# Patient Record
Sex: FEMALE | Employment: FULL TIME | URBAN - METROPOLITAN AREA
[De-identification: names, ages, dates, MRNs, and addresses within clinical notes are randomized per-mention and may not be internally consistent; named-entity substitution may affect disease eponyms.]

---

## 2017-12-11 ENCOUNTER — COMMUNICATION - HEALTHEAST (OUTPATIENT)
Dept: FAMILY MEDICINE | Facility: CLINIC | Age: 27
End: 2017-12-11

## 2017-12-15 ENCOUNTER — OFFICE VISIT - HEALTHEAST (OUTPATIENT)
Dept: FAMILY MEDICINE | Facility: CLINIC | Age: 27
End: 2017-12-15

## 2017-12-15 DIAGNOSIS — Z23 NEED FOR TDAP VACCINATION: ICD-10-CM

## 2017-12-15 DIAGNOSIS — R76.12 POSITIVE QUANTIFERON-TB GOLD TEST: ICD-10-CM

## 2017-12-15 ASSESSMENT — MIFFLIN-ST. JEOR: SCORE: 1324.61

## 2017-12-18 ENCOUNTER — COMMUNICATION - HEALTHEAST (OUTPATIENT)
Dept: FAMILY MEDICINE | Facility: CLINIC | Age: 27
End: 2017-12-18

## 2019-02-08 ENCOUNTER — OFFICE VISIT - HEALTHEAST (OUTPATIENT)
Dept: FAMILY MEDICINE | Facility: CLINIC | Age: 29
End: 2019-02-08

## 2019-02-08 DIAGNOSIS — R30.0 DYSURIA: ICD-10-CM

## 2019-02-08 DIAGNOSIS — N39.0 URINARY TRACT INFECTION WITHOUT HEMATURIA, SITE UNSPECIFIED: ICD-10-CM

## 2019-02-08 LAB
ALBUMIN UR-MCNC: NEGATIVE MG/DL
AMORPH CRY #/AREA URNS HPF: ABNORMAL /[HPF]
APPEARANCE UR: CLEAR
BACTERIA #/AREA URNS HPF: ABNORMAL HPF
BILIRUB UR QL STRIP: NEGATIVE
COLOR UR AUTO: ABNORMAL
GLUCOSE UR STRIP-MCNC: NEGATIVE MG/DL
HGB UR QL STRIP: ABNORMAL
KETONES UR STRIP-MCNC: NEGATIVE MG/DL
LEUKOCYTE ESTERASE UR QL STRIP: ABNORMAL
NITRATE UR QL: NEGATIVE
PH UR STRIP: 7.5 [PH] (ref 5–8)
RBC #/AREA URNS AUTO: ABNORMAL HPF
SP GR UR STRIP: 1.01 (ref 1–1.03)
SQUAMOUS #/AREA URNS AUTO: ABNORMAL LPF
UROBILINOGEN UR STRIP-ACNC: ABNORMAL
WBC #/AREA URNS AUTO: ABNORMAL HPF
WBC CLUMPS #/AREA URNS HPF: ABNORMAL /[HPF]

## 2019-02-09 LAB — BACTERIA SPEC CULT: NO GROWTH

## 2019-02-11 ENCOUNTER — COMMUNICATION - HEALTHEAST (OUTPATIENT)
Dept: FAMILY MEDICINE | Facility: CLINIC | Age: 29
End: 2019-02-11

## 2019-02-18 ENCOUNTER — OFFICE VISIT - HEALTHEAST (OUTPATIENT)
Dept: FAMILY MEDICINE | Facility: CLINIC | Age: 29
End: 2019-02-18

## 2019-02-18 DIAGNOSIS — Z22.7 TB LUNG, LATENT: ICD-10-CM

## 2019-02-18 DIAGNOSIS — Z13.220 SCREENING FOR LIPID DISORDERS: ICD-10-CM

## 2019-02-18 DIAGNOSIS — Z31.69 PRE-CONCEPTION COUNSELING: ICD-10-CM

## 2019-02-18 DIAGNOSIS — Z00.00 ROUTINE HEALTH MAINTENANCE: ICD-10-CM

## 2019-02-18 DIAGNOSIS — Z12.4 SCREENING FOR MALIGNANT NEOPLASM OF CERVIX: ICD-10-CM

## 2019-02-18 DIAGNOSIS — Z13.1 SCREENING FOR DIABETES MELLITUS: ICD-10-CM

## 2019-02-18 LAB
ALBUMIN SERPL-MCNC: 3.8 G/DL (ref 3.5–5)
ALP SERPL-CCNC: 45 U/L (ref 45–120)
ALT SERPL W P-5'-P-CCNC: 11 U/L (ref 0–45)
ANION GAP SERPL CALCULATED.3IONS-SCNC: 8 MMOL/L (ref 5–18)
AST SERPL W P-5'-P-CCNC: 14 U/L (ref 0–40)
BILIRUB SERPL-MCNC: 1 MG/DL (ref 0–1)
BUN SERPL-MCNC: 8 MG/DL (ref 8–22)
CALCIUM SERPL-MCNC: 9.1 MG/DL (ref 8.5–10.5)
CHLORIDE BLD-SCNC: 106 MMOL/L (ref 98–107)
CHOLEST SERPL-MCNC: 142 MG/DL
CO2 SERPL-SCNC: 25 MMOL/L (ref 22–31)
CREAT SERPL-MCNC: 0.7 MG/DL (ref 0.6–1.1)
ERYTHROCYTE [DISTWIDTH] IN BLOOD BY AUTOMATED COUNT: 12.7 % (ref 11–14.5)
FASTING STATUS PATIENT QL REPORTED: YES
GFR SERPL CREATININE-BSD FRML MDRD: >60 ML/MIN/1.73M2
GLUCOSE BLD-MCNC: 91 MG/DL (ref 70–125)
HCT VFR BLD AUTO: 41.7 % (ref 35–47)
HDLC SERPL-MCNC: 50 MG/DL
HGB BLD-MCNC: 13.7 G/DL (ref 12–16)
LDLC SERPL CALC-MCNC: 84 MG/DL
MCH RBC QN AUTO: 29.4 PG (ref 27–34)
MCHC RBC AUTO-ENTMCNC: 32.9 G/DL (ref 32–36)
MCV RBC AUTO: 90 FL (ref 80–100)
PLATELET # BLD AUTO: 401 THOU/UL (ref 140–440)
PMV BLD AUTO: 7.4 FL (ref 7–10)
POTASSIUM BLD-SCNC: 4.2 MMOL/L (ref 3.5–5)
PROT SERPL-MCNC: 6.8 G/DL (ref 6–8)
RBC # BLD AUTO: 4.66 MILL/UL (ref 3.8–5.4)
SODIUM SERPL-SCNC: 139 MMOL/L (ref 136–145)
TRIGL SERPL-MCNC: 42 MG/DL
WBC: 9.5 THOU/UL (ref 4–11)

## 2019-02-18 ASSESSMENT — MIFFLIN-ST. JEOR: SCORE: 1338.55

## 2019-02-19 ENCOUNTER — AMBULATORY - HEALTHEAST (OUTPATIENT)
Dept: FAMILY MEDICINE | Facility: CLINIC | Age: 29
End: 2019-02-19

## 2019-02-19 LAB
25(OH)D3 SERPL-MCNC: 12.6 NG/ML (ref 30–80)
25(OH)D3 SERPL-MCNC: 12.6 NG/ML (ref 30–80)

## 2020-06-05 ENCOUNTER — RECORDS - HEALTHEAST (OUTPATIENT)
Dept: ADMINISTRATIVE | Facility: OTHER | Age: 30
End: 2020-06-05

## 2020-06-10 ENCOUNTER — RECORDS - HEALTHEAST (OUTPATIENT)
Dept: ADMINISTRATIVE | Facility: OTHER | Age: 30
End: 2020-06-10

## 2020-06-11 ENCOUNTER — COMMUNICATION - HEALTHEAST (OUTPATIENT)
Dept: ADMINISTRATIVE | Facility: CLINIC | Age: 30
End: 2020-06-11

## 2020-06-16 ENCOUNTER — OFFICE VISIT - HEALTHEAST (OUTPATIENT)
Dept: EDUCATION SERVICES | Facility: CLINIC | Age: 30
End: 2020-06-16

## 2020-06-16 DIAGNOSIS — O24.410 DIET CONTROLLED GESTATIONAL DIABETES MELLITUS (GDM) IN THIRD TRIMESTER: ICD-10-CM

## 2020-06-24 ENCOUNTER — OFFICE VISIT - HEALTHEAST (OUTPATIENT)
Dept: EDUCATION SERVICES | Facility: CLINIC | Age: 30
End: 2020-06-24

## 2020-06-24 DIAGNOSIS — O24.410 DIET CONTROLLED GESTATIONAL DIABETES MELLITUS (GDM) IN THIRD TRIMESTER: ICD-10-CM

## 2020-07-08 ENCOUNTER — COMMUNICATION - HEALTHEAST (OUTPATIENT)
Dept: ADMINISTRATIVE | Facility: CLINIC | Age: 30
End: 2020-07-08

## 2020-07-09 ENCOUNTER — OFFICE VISIT - HEALTHEAST (OUTPATIENT)
Dept: EDUCATION SERVICES | Facility: CLINIC | Age: 30
End: 2020-07-09

## 2020-07-09 DIAGNOSIS — O24.410 DIET CONTROLLED GESTATIONAL DIABETES MELLITUS (GDM) IN THIRD TRIMESTER: ICD-10-CM

## 2020-07-28 ENCOUNTER — OFFICE VISIT - HEALTHEAST (OUTPATIENT)
Dept: EDUCATION SERVICES | Facility: CLINIC | Age: 30
End: 2020-07-28

## 2020-07-28 DIAGNOSIS — O24.410 DIET CONTROLLED GESTATIONAL DIABETES MELLITUS (GDM) IN THIRD TRIMESTER: ICD-10-CM

## 2020-08-07 ENCOUNTER — COMMUNICATION - HEALTHEAST (OUTPATIENT)
Dept: EDUCATION SERVICES | Facility: CLINIC | Age: 30
End: 2020-08-07

## 2020-08-14 ENCOUNTER — COMMUNICATION - HEALTHEAST (OUTPATIENT)
Dept: EDUCATION SERVICES | Facility: CLINIC | Age: 30
End: 2020-08-14

## 2020-08-14 ENCOUNTER — AMBULATORY - HEALTHEAST (OUTPATIENT)
Dept: EDUCATION SERVICES | Facility: CLINIC | Age: 30
End: 2020-08-14

## 2020-08-14 DIAGNOSIS — O24.410 DIET CONTROLLED GESTATIONAL DIABETES MELLITUS (GDM) IN THIRD TRIMESTER: ICD-10-CM

## 2020-08-18 ENCOUNTER — ANESTHESIA - HEALTHEAST (OUTPATIENT)
Dept: OBGYN | Facility: HOSPITAL | Age: 30
End: 2020-08-18

## 2020-08-18 ENCOUNTER — RECORDS - HEALTHEAST (OUTPATIENT)
Dept: ADMINISTRATIVE | Facility: OTHER | Age: 30
End: 2020-08-18

## 2020-08-19 ENCOUNTER — COMMUNICATION - HEALTHEAST (OUTPATIENT)
Dept: SCHEDULING | Facility: CLINIC | Age: 30
End: 2020-08-19

## 2020-08-19 ENCOUNTER — HOME CARE/HOSPICE - HEALTHEAST (OUTPATIENT)
Dept: HOME HEALTH SERVICES | Facility: HOME HEALTH | Age: 30
End: 2020-08-19

## 2021-05-31 VITALS — HEIGHT: 65 IN | WEIGHT: 134.2 LBS | BODY MASS INDEX: 22.36 KG/M2

## 2021-06-02 VITALS — HEIGHT: 65 IN | BODY MASS INDEX: 22.73 KG/M2 | WEIGHT: 136.4 LBS

## 2021-06-02 VITALS — BODY MASS INDEX: 22.18 KG/M2 | WEIGHT: 133.3 LBS

## 2021-06-08 NOTE — TELEPHONE ENCOUNTER
06.11 - called x 1 - rings, then  stats call cannot be completed at this time, to hang up and try again

## 2021-06-08 NOTE — PATIENT INSTRUCTIONS - HE
1. Check urine for ketones in the morning. Your goal is negative or trace ketones. If you have ketones in your urine it means you are not eating enough before you go to bed. Eat a larger bedtime snack and include protein. Remember that ketones are not good for your baby. Drinking water during the day helps to dilute ketones.    2. Test blood sugar 4 times per day. Before breakfast and 1 hour after meals.        Blood sugar goals:       Before breakfast: less 95      1 hour after meals: less 140      2 hours after meals: less 120    3. Eat 3 meals and 3 snacks per day according to the meal plan.   Breakfast: 30-45 grams of carbohydrate with protein   Lunch and dinner: 45-60 grams of carbohydrate  Snacks: 15-30 grams of carbohydrate with protein    4. Avoid fruit, juice and cereal at breakfast. These foods tend to cause high blood sugar.    5. Include high fiber, whole grain foods instead of processed white food. Healthier choices are whole grain bread, pasta and brown rice or wild rice.     6. Avoid high sugar, low nutrient foods like sugary drinks, candy chocolate, syrup, chips and desserts.    7. Staying active after meals helps to decrease blood sugar.    8. Keep a detailed food and blood sugar record and note ketone levels.     9. Next telephone visit in one week on Wednesday, 6/24/2020 at 3:00 pm.

## 2021-06-08 NOTE — TELEPHONE ENCOUNTER
[facility]   Referring Provider: Dr. Zavaleta  DX: GDM     Ref./rec. Were received in DM consult folder on 06.10.2020 @ 7:54 pm

## 2021-06-08 NOTE — PROGRESS NOTES
Diabetes Educator has received verbal consent for a telephone visit for the patient.    GESTATIONAL DIABETES CARE PLAN    Assessment: Initial telephone visit for gestational diabetes education and counseling. Instructed on what is gestational diabetes and how to manage it. Explained how to check blood sugar at home. Encouraged patient to view a YouTube video or read the meter manual to learn how to use the meter. Discussed what are ketones and how to check for them in the morning. Instructed on carbohydrate counting, meal planning and label reading. Both Clara and her  were on the call today. They had many excellent questions, especially about food choices. Sent literature by email with permission including an   2 day sample menu and  carbohydrate counting list. Clara does not have a printer so written information and AVS also sent to their home address.    Plan: Check blood glucose 4 times daily, check ketones each morning before breakfast and follow dietary guidelines as recommended during today's encounter. Staying active after meals helps to lower blood sugar levels. Next telephone visit in one week.     Provider: Dr. Marleny Zavaleta [facility]  Provider's Diagnosis (per referral form): Diet controlled gestational diabetes in 3rd trimester (024.410)    Weight:151.9 lb at home today    Pre-pregnancy weight: 140 lb    Glucose screen: 227 (6/5/2020)  OGTT: not given  EDC: Estimated Date of Delivery: 8/31/2020. 29w0d  Pregnancy #: 1  Previous GDM: No  Medications: No current outpatient medications on file.    M[facility]: [facility] covered by insurance    BG monitoring goals: Fasting <95; 1 hour post start of meal <140. Test 4 x per day.  Check fasting a.m. ketones: Yes  GDM meal pattern/carb counting taught per guidelines: Yes  Goals: Nutrition: GDM meal plan  Activity:  Walking after meals when able/staying active  Monitoring: Check BG 4x/day  (before breakfast and 1 hour after each meal)  Check urine ketones once daily every morning    DIABETES CARE PLAN AND EDUCATION RECORD  FV Understanding Gestational Diabetes  FV GDM blood sugar log  IDC [place] carb list  FV Asian American sample menu  FV Asian American foods with pictures and carb info    Gestational Diabetes Disease Process/Management During Pregnancy: Discussed and Literature provided    Meter (per above goals): Discussed, Literature provided.    Nutrition Management  Weight: Assessed, Discussed and Literature provided  Portions/Balance: Assessed, Discussed and Literature provided  Carb ID/Count: Assessed, Discussed and Literature provided  Label Reading: Assessed, Discussed and Literature provided  Menu Planning: Assessed, Discussed and Literature provided    Physical Activity: Discussed and Literature provided    Acute Complications: Prevent, Detect, Treat:    Goal Setting and Problem Solving: Assessed and Discussed  Barriers: Assessed  Psychosocial Adjustments: Assessed      Time: 60 Minutes  Visit Type: Diabetes Self-Management Training ()    Brigida Martinez RD, LD, [facility]  Certified Diabetes Care and   6/16/2020

## 2021-06-08 NOTE — TELEPHONE ENCOUNTER
Date: 6/16/2020 Status: Paloma   Time: 3:00 PM Length: 30   Visit Type: TELEPHONE VISIT NEW [2800] Copay: $0.00   Provider: Brigida Martinez RD, CDE

## 2021-06-09 NOTE — TELEPHONE ENCOUNTER
[facility] calling to obtain notes from previous visits.    Please fax to      Phone @ [phone]

## 2021-06-09 NOTE — PATIENT INSTRUCTIONS - HE
PLAN:  1. Continue to check urine for ketones in the morning. Your goal is negative or trace ketones. Continue to drink a lot of water to help dilute the ketones.    2. Continue to check blood sugar 4 times per day. Before breakfast and 1 hour after meals.        Blood sugar goals:       Before breakfast: less 95      1 hour after meals: less 140      2 hours after meals: less 120    3. Continue to eat 3 meals and 3 snacks per day according to the meal plan.     4. Continue to stay active after meals to manage blood sugars.    5. Continue to keep a detailed food and blood sugar record and note ketone levels.     6. Next telephone follow-up in about 2 weeks on Tuesday, 7/28/2020 at 3:00 pm    7. Call Diabetes Care or email if you have 3 blood sugars above target in one week.

## 2021-06-09 NOTE — PATIENT INSTRUCTIONS - HE
PLAN  1. Continue to check urine for ketones in the morning. Your goal is negative or trace ketones. If you have ketones in your urine it means you are not eating enough before you go to bed. Eat a larger bedtime snack and include protein. Remember that ketones are not good for your baby. Drinking water during the day helps to dilute ketones. 64 ounces of water each day is enough water.    2. Continue to check blood sugar 4 times per day. Before breakfast and 1 hour after meals.        Blood sugar goals:      Before breakfast: less 95      1 hour after meals: less 140      2 hours after meals: less 120    3. Continue to eat 3 meals and 3 snacks per day according to the meal plan.  Breakfast: 30-45 grams of carbohydrate with protein  Lunch and dinner: 45-60 grams of carbohydrate  Snacks: 15-30 grams of carbohydrate with protein    4. Continue to stay active after meals to manage blood sugars. Walking or staying active for 10-15 minutes after meals is recommended.    5. Keep a detailed food and blood sugar record and note ketone levels.    6. Next follow-up in 2 weeks on Thursday, July 9th at 3:00 pm.    7. Call Diabetes Care if you have 3 blood sugars above target in one week or have small or moderate ketones or with any questions.

## 2021-06-10 NOTE — ANESTHESIA POSTPROCEDURE EVALUATION
Patient: Clara Phillips  * No procedures listed *  Anesthesia type: epidural    Patient location: Labor and Delivery  Last vitals: No vitals data found for the desired time range.    Post vital signs: stable  Level of consciousness: awake and responds to simple questions  Post-anesthesia pain: pain controlled  Post-anesthesia nausea and vomiting: no  Pulmonary: unassisted, return to baseline  Cardiovascular: stable and blood pressure at baseline  Hydration: adequate  Anesthetic events: no    QCDR Measures:  ASA# 11 - Reanna-op Cardiac Arrest: ASA11B - Patient did NOT experience unanticipated cardiac arrest  ASA# 12 - Reanna-op Mortality Rate: ASA12B - Patient did NOT die  ASA# 13 - PACU Re-Intubation Rate: ASA13B - Patient did NOT require a new airway mgmt  ASA# 10 - Composite Anes Safety: ASA10A - No serious adverse event    Additional Notes:  Neuraxial block has worn off, no residual numbness or weakness. Pain controlled. Denies headache or back pain. No further questions or concerns. Instructed that we are available 24/7 should she have questions pertaining to her epidural.

## 2021-06-10 NOTE — ANESTHESIA PREPROCEDURE EVALUATION
Anesthesia Evaluation      Patient summary reviewed   No history of anesthetic complications     Airway   Mallampati: II  Neck ROM: full   Pulmonary - negative ROS   (-) asthma, not a smoker                         Cardiovascular - negative ROS  (-) hypertension   Neuro/Psych - negative ROS     Endo/Other    (+) diabetes mellitus (gestational), pregnant  (-) no obesity     GI/Hepatic/Renal - negative ROS   (-) GERD          Dental - normal exam                        Anesthesia Plan  Planned anesthetic: epidural  Patient requests labor epidural. Chart reviewed, including labs. Reviewed options and risks with the patient, including but not limited to: bleeding, infection, damage to tissues under the skin (nerves, muscles, blood vessels), hypotension, headache, and epidural failure. Questions answered, consent signed. Patient agrees to elective labor epidural.     ASA 2     Anesthetic plan and risks discussed with: patient and spouse    Post-op plan: epidural analgesia

## 2021-06-10 NOTE — PROGRESS NOTES
Diabetes Educator has received verbal consent for a telephone visit for the patient. Declined video visit.    GESTATIONAL DIABETES CARE PLAN-FOLLOW-UP   Telephone visit #4    ASSESSMENT/PLAN:   Two week telephone follow-up visit for gestational diabetes education and counseling. Clara is testing her blood sugar 4 times per day. She had a few blood sugars slightly above target that were not explained within 2 weeks.   Patient is testing her urine for ketones in the morning with most results negative. Both Clara and her  work together to portion out food and document daily food intake.    Instructed on post-tal care and how to prevent type 2 diabetes in the future. Completed education topics. No future appointments scheduled.    Blood glucose out of target the past 2 weeks  FBG 96  1 hour after  Lunch 143  2 hours after lunch 128    PLAN:  1. Continue to check urine for ketones in the morning. Your goal is negative or trace ketones.  2. Continue to check blood sugar 4 times per day. Before breakfast and 1 hour after meals.        Blood sugar goals:       Before breakfast: less 95      1 hour after meals: less 140      2 hours after meals: less 120    3. Continue to eat 3 meals and 3 snacks per day according to the meal plan.     4. Continue to stay active after meals to manage blood sugars.    Call Diabetes Care or send a [employer] message if you have 3 blood sugars above target in one week.    Diabetes Care: [phone]      SUBJECTIVE/OBJECTIVE:  Clara Phillips is a 29 y.o. female referred by Dr. Marleny Zavaleta, [facility]    Weight: no current    Pre-pregnancy weight:140 lb  GDM screen: 227 (20)  OGTT: No results found for this or any previous visit.  CAESAR: Estimated Date of Delivery: 2020 35w0d Having a girl  Pregnancy #: 1  Previous GDM: No    BG monitoring goals: Fasting <95; 1 hour post start of meal <140. Test 4 times per day.  Check fasting a.m. ketones: Yes  GDM meal plan/carb  counting taught per guidelines: Yes    EDUCATION RECORD    Gestational Diabetes Disease Process/Preconception Care/Management During Pregnancy/Postpartum:Discussed and Literature provided    Meter (per above goals): Competent. Contour Next EZ    Nutrition Management    Weight: Competent  Portions/Balance: Assessed and Discussed and Competent  Carb ID/Count: Competent  Label Reading: Competent     Physical Activity: Discussed and Competent    Medications: N/A    Acute Complications: Prevent, Detect, Treat:    Hypoglycemia: N/A  Hyperglycemia: Assessed and Discussed and Competent  Goal Setting and Problem Solving: Assessed and Discussed and Literature provided  Barriers: Assessed  Psychosocial Adjustments: Assessed    Time: 30 minutes  Visit Type: GDM Individual Follow-up. Diabetes Self-Management Training ()  Provider's Diagnosis:  Diet Controlled Gestational Diabetes in 3rd trimester (024.410)    Brigida Martinez RD, LD, CDCES  Certified Diabetes Care and   7/28/2020

## 2021-06-10 NOTE — PATIENT INSTRUCTIONS - HE
Today is your last visit. Continue to check for ketones once daily in the morning, continue to check your blood sugars 4 times per day until you deliver your baby. Call Diabetes Care or send a [employer] message if you have 3 elevated blood sugars within one week.   Diabetes Care [phone].     During labor, your blood sugar levels will be checked often. The effects of labor on blood sugar levels will vary from woman to woman, hour to hour.     After your baby is born, your blood sugar levels should quickly return to normal. Be sure to follow the testing guidelines below:    Two to six weeks after delivery, start checking your blood sugar again. Do four tests each week until you see your care provider again. Write your results in a log book.    When you are not pregnant, normal blood sugars are:    Before breakfast: Under 100  2 hours after meals Under 140      Six to twelve weeks after delivery at your provider visit ask to have a glucose test to be sure your diabetes is gone. Show the doctor your log book.    Every 1 to 3 years have your doctor test you for diabetes at your regular physical exam.    After delivery, and your provider has said that it is safe to be active, work toward getting 150 minutes each week of physical activity. Maintaining a healthy body weight and physical activity decrease your risk of getting Type 2 diabetes.

## 2021-06-10 NOTE — ANESTHESIA PROCEDURE NOTES
Epidural Block    Patient location during procedure: OB  Time Called: 8/18/2020 9:51 AM  Reason for Block:labor epidural  Staffing:  Performing  Anesthesiologist: Sonal Kim MD  Preanesthetic Checklist  Completed: patient identified, risks, benefits, and alternatives discussed, timeout performed, consent obtained, at patient's request, airway assessed, oxygen available, suction available, emergency drugs available and hand hygiene performed  Procedure  Patient position: sitting  Prep: ChloraPrep  Patient monitoring: continuous pulse oximetry, heart rate and blood pressure  Approach: midline  Location: L3-L4  Injection technique: JUAN air and JUAN saline  Number of Attempts:2  Needle  Needle type: [employer]   Needle gauge: 18 G     Catheter in Space: 5      Additional Notes:  JUAN at 6 cm. Catheter at 11 cm at skin. Negative aspiration heme/CSF. Negative test dose. Transient left paresthesia. No pain on injection of test dose. Patient tolerated procedure well.

## 2021-06-11 ENCOUNTER — OFFICE VISIT - HEALTHEAST (OUTPATIENT)
Dept: FAMILY MEDICINE | Facility: CLINIC | Age: 31
End: 2021-06-11

## 2021-06-11 DIAGNOSIS — E55.9 VITAMIN D DEFICIENCY: ICD-10-CM

## 2021-06-11 DIAGNOSIS — Z76.89 ENCOUNTER TO ESTABLISH CARE: ICD-10-CM

## 2021-06-11 DIAGNOSIS — Z13.228 SCREENING FOR METABOLIC DISORDER: ICD-10-CM

## 2021-06-11 DIAGNOSIS — Z86.32 H/O GESTATIONAL DIABETES MELLITUS, NOT CURRENTLY PREGNANT: ICD-10-CM

## 2021-06-11 DIAGNOSIS — L30.9 PERIANAL DERMATITIS: ICD-10-CM

## 2021-06-11 RX ORDER — ZINC OXIDE 20 %
OINTMENT (GRAM) TOPICAL
Qty: 56.7 G | Refills: 0 | Status: SHIPPED | COMMUNITY
Start: 2021-06-11 | End: 2021-11-29

## 2021-06-11 RX ORDER — CLOTRIMAZOLE AND BETAMETHASONE DIPROPIONATE 10; .64 MG/G; MG/G
CREAM TOPICAL
Qty: 15 G | Refills: 1 | Status: SHIPPED | OUTPATIENT
Start: 2021-06-11 | End: 2021-11-29

## 2021-06-11 ASSESSMENT — MIFFLIN-ST. JEOR: SCORE: 1438

## 2021-06-14 NOTE — PROGRESS NOTES
"Assessment/Plan:     1. Positive QuantiFERON-TB Gold test  Chest xray pending.  Little concern for active TB.  Will consider treatment of latent TB.   - XR Chest 2 Views    2. Need for Tdap vaccination  - Tdap vaccine,  8yo or older,  IM        Subjective:     Clara Phillips is a 26 y.o. female accompanied by her  who presents with a positive TB quantiferon test.  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  Patient is originally from [facility], and moved her ~10 months ago.  Her  is working at [employer].  Patient lived in [place] her whole life, up until now.  Denies any fever, chest pain, cough, or shortness of breath.  No known exposure to TB.  Patient is otherwise feeling well and has no complaints.       The following portions of the patient's history were reviewed and updated as appropriate: allergies, current medications, past family history, past medical history, past social history, past surgical history and problem list.    Review of Systems  Pertinent items are noted in HPI.     Objective:     /60 (Patient Site: Right Arm, Patient Position: Sitting, Cuff Size: Adult Regular)  Pulse 88  Ht 5' 5\" (1.651 m)  Wt 134 lb 3.2 oz (60.9 kg)  LMP 11/24/2017 (Exact Date)  Breastfeeding? No  BMI 22.33 kg/m2    General Appearance: Alert, cooperative, no distress, appears stated age  Neck: Supple, symmetrical, trachea midline, no adenopathy  Lungs: Clear to auscultation bilaterally, respirations unlabored  Heart: Regular rate and rhythm, S1 and S2 normal, no murmur, rub, or gallop    Asiya Chopra, NP-C    "

## 2021-06-16 PROBLEM — O24.410 DIET CONTROLLED GESTATIONAL DIABETES MELLITUS (GDM) IN THIRD TRIMESTER: Status: ACTIVE | Noted: 2020-06-16

## 2021-06-16 PROBLEM — E55.9 VITAMIN D DEFICIENCY: Status: ACTIVE | Noted: 2019-02-19

## 2021-06-16 PROBLEM — Z34.90 PREGNANT: Status: ACTIVE | Noted: 2020-08-18

## 2021-06-16 PROBLEM — Z22.7 TB LUNG, LATENT: Status: ACTIVE | Noted: 2019-02-18

## 2021-06-17 NOTE — TELEPHONE ENCOUNTER
Telephone Encounter by Brigida Martinez RD, CDE at 8/7/2020  2:03 PM     Author: Brigida Martinez RD, CDE Service: -- Author Type: Diabetes Ed    Filed: 8/7/2020  2:09 PM Encounter Date: 8/7/2020 Status: Signed    : Brigida Martinez RD, CDE (Registered Dietitian)     Summary: GDM food and blood sugar records          Clara continues to check her blood sugar 4 times per day. She had 2 elevated blood sugars after meals. One elevation was explained. Ketones negative. The light blue column is activity. She is less active than previous. No further telephone visits indicated. Patient to contact me for 3 blood sugar elevations in one week or if she has small, moderate or large ketones.    CAESAR 8/31/3030

## 2021-06-17 NOTE — PATIENT INSTRUCTIONS - HE
Patient Instructions by Asiya Chopra NP at 2/18/2019  7:40 AM     Author: Asiya Chopra NP Service: -- Author Type: Nurse Practitioner    Filed: 2/18/2019  7:45 AM Encounter Date: 2/18/2019 Status: Signed    : Asiya Chopra NP (Nurse Practitioner)       Patient Education     Prevention Guidelines, Women Ages 18 to 39  Screening tests and vaccines are an important part of managing your health. A screening test is done to find possible disorders or diseases in people who don't have any symptoms. The goal is to find a disease early so lifestyle changes can be made and you can be watched more closely to reduce the risk of disease, or to detect it early enough to treat it most effectively. Screening tests are not considered diagnostic, but are used to determine if more testing is needed. Health counseling is essential, too. Below are guidelines for these, for women ages 18 to 39. Talk with your healthcare provider to make sure youre up-to-date on what you need.  Screening Who needs it How often   Alcohol misuse All women in this age group At routine exams   Blood pressure All women in this age group Yearly checkup if your blood pressure is normal  Normal blood pressure is less than 120/80 mm Hg  If your blood pressure reading is higher than normal, follow the advice of your healthcare provider   Breast cancer All women in this age group should talk with their healthcare providers about the need for clinical breast exams (CBE)1 Clinical breast exam every 3 years1   Cervical cancer Women ages 21 and older Women between ages 21 and 29 should have a Pap test every 3 years; women between ages 30 and 65 are advised to have a Pap test plus an HPV test every 5 years   Chlamydia Sexually active women ages 24 and younger, and women at increased risk for infection Every 3 years if you're at risk or have symptoms   Depression All women in this age group At routine exams   Type 2 diabates, prediabetes All women  with no symptoms who are overweight or obese and have 1 or more other risk factors for diabetes At least every 3 years. Also, testing for diabetes during pregnancy after the 24th week.    Type 2 diabetes, prediabetes All women diagnosed with gestational diabetes Lifelong testing every 3 years   Type 2 diabetes All women with prediabetes Every year   Gonorrhea Sexually active women at increased risk for infection At routine exams   Hepatitis C Anyone at increased risk At routine exams   HIV All women should be tested at least once for HIV between the ages of 13 and 64 At routine exams. Those with risk factors for HIV should be tested at least annually.    Obesity All women in this age group At routine exams   Syphilis Women at increased risk for infection should talk with their healthcare provider At routine exams   Tuberculosis Women at increased risk for infection should talk with their healthcare provider Ask your healthcare provider   Vision All women in this age group At least 1 complete exam in your 20s, and 2 in your 30s   Vaccine2 Who needs it How often   Chickenpox (varicella) All women in this age group who have no record of this infection or vaccine 2 doses; the second dose should be given 4 to 8 weeks after the first dose   Hepatitis A Women at increased risk for infection should talk with their healthcare provider 2 doses given at least 6 months apart   Hepatitis B Women at increased risk for infection should talk with their healthcare provider 3 doses over 6 months; second dose should be given 1 month after the first dose; the third dose should be given at least 2 months after the second dose and at least 4 months after the first dose   Haemophilus influenzae Type B (HIB) Women at increased risk for infection should talk with their healthcare provider 1 to 3 doses   Human papillomavirus (HPV) All women in this age group up to age 26 3 doses; the second dose should be given 1 to 2 months after the first  dose and the third dose given 6 months after the first dose   Influenza (flu) All women in this age group Once a year   Measles, mumps, rubella (MMR) All women in this age group who have no record of these infections or vaccines 1 or 2 doses   Meningococcal Women at increased risk for infection should talk with their healthcare provider 1 or more doses   Pneumococcal conjugate vaccine (PCV13) and pneumococcal polysaccharide vaccine (PPSV23) Women at increased risk for infection should talk with their healthcare provider PCV13: 1 dose ages 19 to 65 (protects against 13 types of pneumococcal bacteria)  PPSV23: 1 to 2 doses through age 64, or 1 dose at 65 or older (protects against 23 types of pneumococcal bacteria)      Tetanus/diphtheria/pertussis (Td/Tdap) booster All women in this age group Td every 10 years, or a one-time dose of Tdap instead of a Td booster after age 18, then Td every 10 years   Counseling Who needs it How often   BRCA gene mutation testing for breast and ovarian cancer susceptibility Women with increased risk for having gene mutation When your risk is known   Breast cancer and chemoprevention Women at high risk for breast cancer When your risk is known   Diet and exercise Women who are overweight or obese When diagnosed, and then at routine exams   Domestic violence Women at the age in which they are able to have children At routine exams   Sexually transmitted infection prevention Women who are sexually active At routine exams   Skin cancer Prevention of skin cancer in fair-skinned adults At routine exams   Use of tobacco and the health effects it can cause All women in this age group Every visit   1According to the ACS, women ages 20 to 39 years should have a clinical breast exam (CBE) as part of their routine health exam every 3 years. Breast self-exams are an option for women starting in their 20s. But the USPSTF does not recommend CBE.  2Those who are 18 years old and not up-to-date on  their childhood vaccines should get all appropriate catch-up vaccines recommended by the CDC.  Date Last Reviewed: 10/1/2017    3901-0467 [employer] [address] [place] [address], [place] [address]. All rights reserved. This information is not intended as a substitute for professional medical care. Always follow your healthcare professional's instructions.

## 2021-06-17 NOTE — TELEPHONE ENCOUNTER
Telephone Encounter by Brigida Martinez RD, CDE at 8/14/2020  5:29 PM     Author: Brigida Martinez RD, CDE Service: -- Author Type: Diabetes Ed    Filed: 8/14/2020  5:35 PM Encounter Date: 8/14/2020 Status: Signed    : Brigida Martinez RD, CDE (Registered Dietitian)       GDM Follow-up    Clara continues to check her blood sugar 4 times per day and keep detailed food and blood sugar records.    Ketones are negative    2 elevated blood sugars in the past 2 weeks.        1 hour after dinner  after eating too many carbs    Currently not walking due to heat and humidity    CAESAR 8/31/2020    Renewed prescriptions for test strips and lancets to last until the end of her pregnancy. Praised her for her efforts.

## 2021-06-23 NOTE — PROGRESS NOTES
Assessment/Plan:     1. Urinary tract infection without hematuria, site unspecified  Positive UA.  Prescribed Macrobid two times a day x 5 days. Push fluids.  Urine culture pending; will notify if a change in antibiotic is indicated.   - nitrofurantoin, macrocrystal-monohydrate, (MACROBID) 100 MG capsule; Take 1 capsule (100 mg total) by mouth 2 (two) times a day for 5 days.  Dispense: 10 capsule; Refill: 0  - Culture, Urine    2. Dysuria  - Urinalysis-UC if Indicated        Subjective:     Clara Phillips is a 28 y.o. female who presents with complaints of dysuria and urinary frequency/urgency times 3 days.  Symptoms have worsened.  Associated symptoms include malodorous urine and lower abdominal pain.  Patient does feel feverish, although she has not checked any temperatures at home.  Denies any blood in her urine, back pain, or N/V/D.  She has been treated for a UTI once in the past.  She is drinking a lot of water, otherwise no other treatments tried.       The following portions of the patient's history were reviewed and updated as appropriate: allergies, current medications.  ,  Review of Systems  A comprehensive review of systems was performed and was otherwise negative    Objective:     /80   Pulse 80   Temp 98.7  F (37.1  C)   Wt 133 lb 4.8 oz (60.5 kg)   LMP 01/25/2019 (Exact Date)   BMI 22.18 kg/m      General Appearance: Alert, cooperative, no distress, appears stated age  Back: no CVA tenderness  Lungs: Clear to auscultation bilaterally, respirations unlabored  Heart: Regular rate and rhythm, S1 and S2 normal, no murmur, rub, or gallop   Abdomen: Soft, suprapubic tenderness, bowel sounds active all four quadrants,  no masses, no organomegaly      Asiya Chopra, NA

## 2021-06-24 NOTE — PROGRESS NOTES
FEMALE PREVENTATIVE EXAM    Assessment and Plan:       1. Routine health maintenance  Healthy female exam  - HM2(CBC w/o Differential)  - Vitamin D, Total (25-Hydroxy)    2. Screening for diabetes mellitus  - Comprehensive Metabolic Panel    3. Screening for lipid disorders  - Lipid [place] FASTING    4. Screening for malignant neoplasm of cervix  - Gynecologic Cytology (PAP Smear)    5. TB lung, latent  Patient did not start treatment for this and declines to start at this time.  Discussed possible risks.     6. Pre-conception counseling  Discussed starting a prenatal vitamin at least 1 month prior to trying for pregnancy.  Recommend at least 0.4 mg of folic acid.      Next follow up:  Return in about 1 year (around 2/18/2020) for Physical.    Immunization Review  Adult Imm Review: Declines immunizations today    I discussed the following with the patient:   Adult Healthy Living: Importance of regular exercise  Healthy nutrition    I have had an Advance Directives discussion with the patient.    Subjective:   Chief Complaint: Clara Phillips is an 28 y.o. female here for a preventative health visit.     HPI:  Patient is  without children.  She works in [employer].    Patient was seen last week for a UTI.  Her urine culture was actually negative, but she finished the antibiotic.  Her symptoms are completely resolved.    Patient was seen last year due to a positive quantiferon test.  Her CXR was negative.  Patient was prescribed Isoniazid for latent TB, but tells me she never started the medication.  She does not know why.  We discussed the benefits and risks of treating for latent TB.  She does not wish to restart the medication at this time.    Patient and her  are looking to start trying for pregnancy this summer.  They are currently using condoms.      No history of pap exam.  Menstrual periods are regular and predictable.    No concerns today.     Healthy Habits  Are you taking a daily aspirin? No  Do you  "typically exercising at least 40 min, 3-4 times per week?  NO  Do you usually eat at least 4 servings of fruit and vegetables a day, include whole grains and fiber and avoid regularly eating high fat foods? Yes  Have you had an eye exam in the past two years? Yes  Do you see a dentist twice per year? Yes  Do you have any concerns regarding sleep? No    Safety Screen  If you own firearms, are they secured in a locked gun cabinet or with trigger locks? The patient does not own any firearms  Do you feel you are safe where you are living?: Yes (2/18/2019  7:46 AM)  Do you feel you are safe in your relationship(s)?: Yes (2/18/2019  7:46 AM)      Review of Systems:  Please see above.  The rest of the review of systems are negative for all systems.     Pap History:   No - age 21-29 PAP every 3 years recommended  Cancer Screening       Status Date      PAP SMEAR Overdue 12/22/2010           Patient Care Team:  Asiya Chopra NP as PCP - General (Family Medicine)        History     Reviewed By Date/Time Sections Reviewed    Asiya Chopra NP 2/18/2019  7:54 AM Tobacco    Asiya Chopra NP 2/18/2019  7:49 AM Tobacco    Lori Bonds MA 2/18/2019  7:46 AM Tobacco            Objective:   Vital Signs:   Visit Vitals  /68   Pulse 64   Temp 98.5  F (36.9  C)   Ht 5' 5.25\" (1.657 m)   Wt 136 lb 6.4 oz (61.9 kg)   LMP 01/25/2019 (Exact Date)   BMI 22.52 kg/m           PHYSICAL EXAM  General Appearance: Alert, cooperative, no distress, appears stated age  Head: Normocephalic, without obvious abnormality, atraumatic  Eyes: PERRL, conjunctiva/corneas clear, EOM's intact  Ears: Normal TM's and external ear canals, both ears  Nose: Nares normal, septum midline  Throat: Lips, mucosa, and tongue normal; teeth and gums normal  Neck: Supple, symmetrical, trachea midline, no adenopathy;  thyroid: not enlarged, symmetric, no tenderness/mass/nodules  Lungs: Clear to auscultation bilaterally, respirations unlabored  Breasts: No " breast masses, tenderness, asymmetry, or nipple discharge.  Heart: Regular rate and rhythm, S1 and S2 normal, no murmur, rub, or gallop  Abdomen: Soft, non-tender, bowel sounds active all four quadrants,  no masses, no organomegaly  Pelvic: Normally developed genitalia with no external lesions or eruptions. Vagina and cervix show no lesions, inflammation, discharge or tenderness. No cystocele, No rectocele. No adnexal mass or tenderness.  Extremities: Extremities normal, atraumatic, no cyanosis or edema  Skin: Skin color, texture, turgor normal, no rashes or lesions  Lymph nodes: Cervical, supraclavicular, and axillary nodes normal  Neurologic: Normal   Psychologic: appropriate affective, answers all of my questions appropriately. No hallucinations, delusion, or suicidal ideations.    Asiya Chopra, NP

## 2021-06-26 NOTE — PROGRESS NOTES
Assessment/plan   Clara Phillips is a 30 y.o. female who is New  patient to my practice here with   Chief Complaint   Patient presents with     Anal Itching     since september 2020 after giving delivery        Clara was seen today for anal itching.    Diagnoses and all orders for this visit:    Screening for metabolic disorder  -     Lipid [place]; Future  -     Thyroid Stimulating Hormone (TSH); Future    Encounter to establish care    Perianal dermatitis  -     clotrimazole-betamethasone (LOTRISONE) cream; Apply to affected area 2 times daily  -     zinc oxide 20 % ointment; Apply to affected area to protect the skin after applying Lotrisone    H/O gestational diabetes mellitus, not currently pregnant  -     Glycosylated Hemoglobin A1c; Future    Vitamin D deficiency  -     Vitamin D, Total (25-Hydroxy); Future        Patient Instructions   To help with itching around the anal area we will recommend keeping the area completely clean and dry all the time  Advised to wash the area after each bowel movement  Avoid all irritants which might affect a bowel movement for example spicy food peña caffeine etc.  Also add fiber supplement like Metamucil or  variety call is Isabgoal mix in water every night to increase the fiber and have a formed stool every night  For local treatment we have send the prescription for Lotrisone which he use every night to help with the itching also added zinc oxide ointment as a protectant but you can always use the diaper rash cream which could be Desitin or A and D ointment to extra protect the skin if no improvement in symptoms please call us back    Blanquita Simmons MD 6/11/2021 1:39 PM          Subjective:      HPI: Clara Phillips is a 30 y.o. female who is here with chief complaint of perianal itching initially it was plan to do Pap and physical and establish care but patient was not ready to do any Pap or labs today so she will come back for that she only wanted to talk  about her itching in the perianal area  Patient just had a vacuum-assisted delivery 10-month ago currently breast-feeding had a second-degree repair gone through some constipation diarrhea cycle and also some pelvic floor dysfunction which required physical therapy and last 6 to 7 months now struggling with lot of perianal discomfort mostly itching and sometimes burning sensation  Denies any severe constipation or diarrhea currently but itching is so severe sometimes at nighttime that she could not sleep she had used some numbing gel in the past which was given for her repair which helped some time but not enough    Patient is transferring care from [facility] not able to review any records from them only records available are through [facility] where she delivered the child I did able to review some records from them she she did had history of gestational diabetes            I have personally reviewed the patient's allergies, medications, past medical history, family history, social history, rooming notes and problem list in detail and updated the patient record as necessary.      Past Medical History:   Diagnosis Date     Diet controlled gestational diabetes mellitus (GDM) in third trimester 6/16/2020     Past Surgical History:   Procedure Laterality Date     NO PAST SURGERIES       Patient has no known allergies.  Current Outpatient Medications   Medication Sig Dispense Refill     acetaminophen (TYLENOL) 325 MG tablet Take 1-2 tablets (325-650 mg total) by mouth every 4 (four) hours as needed.  0     docusate sodium (COLACE) 100 MG capsule Take 1 capsule (100 mg total) by mouth daily. 30 capsule 0     prenatal vitamin iron-folic acid 27mg-0.8mg (PRENATAL S) 27 mg iron- 800 mcg Tab tablet Take 1 tablet by mouth daily.       cholecalciferol, vitamin D3, 1,000 unit (25 mcg) tablet Take 2,500 Units by mouth daily.       clotrimazole-betamethasone (LOTRISONE) cream Apply to affected area 2 times  "daily 15 g 1     zinc oxide 20 % ointment Apply to affected area to protect the skin after applying Lotrisone 56.7 g 0     No current facility-administered medications for this visit.      Family History   Problem Relation Age of Onset     Hypertension Mother      Hypertension Father      No Medical Problems Brother        Patient Active Problem List   Diagnosis     TB lung, latent     Vitamin D deficiency     Diet controlled gestational diabetes mellitus (GDM) in third trimester     Pregnant       Review of Systems   12 point comprehensive review of systems was negative except as noted and HPI     Social History     Social History Narrative     Not on file       Objective:     Vitals:    06/11/21 1132   BP: 110/80   Pulse: 80   Temp: 98.9  F (37.2  C)   TempSrc: Oral   SpO2: 99%   Weight: 162 lb 11.2 oz (73.8 kg)   Height: 5' 4\" (1.626 m)       Physical Exam:   Physical Exam:  General Appearance:  Appears comfortable, Alert, cooperative, no distress,   Anal /rectal: Mild anal dermatitis no hemorrhoids or skin tags  25 minutes minutes spent on the day of encounter doing chart review, history and exam, documentation, and further activities as noted.     This note has been dictated using voice recognition software. Any grammatical or context distortions are unintentional and inherent to the software  Blanquita Simmons MD  "

## 2021-06-26 NOTE — PATIENT INSTRUCTIONS - HE
To help with itching around the anal area we will recommend keeping the area completely clean and dry all the time  Advised to wash the area after each bowel movement  Avoid all irritants which might affect a bowel movement for example spicy food peña caffeine etc.  Also add fiber supplement like Metamucil or  variety call is Isabgoal mix in water every night to increase the fiber and have a formed stool every night  For local treatment we have send the prescription for Lotrisone which he use every night to help with the itching also added zinc oxide ointment as a protectant but you can always use the diaper rash cream which could be Desitin or A and D ointment to extra protect the skin if no improvement in symptoms please call us back    Blanquita Simmons MD 6/11/2021 1:39 PM

## 2021-06-27 ENCOUNTER — HEALTH MAINTENANCE LETTER (OUTPATIENT)
Age: 31
End: 2021-06-27

## 2021-06-29 NOTE — PROGRESS NOTES
Progress Notes by Brigida Martinez RD, CDE at 2020  3:00 PM     Author: Brigida Martinez RD, CDE Service: -- Author Type: Diabetes Ed    Filed: 2020  4:44 PM Encounter Date: 2020 Status: Signed    : Brigida Martinez RD, ANDREWE (Registered Dietitian)       Diabetes Educator has received verbal consent for a telephone visit for the patient.  Unable to conduct video visit    GESTATIONAL DIABETES CARE PLAN-Telephone visit #3    ASSESSMENT/PLAN:   Two week follow-up telephone visit for gestational diabetes education and counseling. Clara is testing her blood sugar 4 times per day. She had a few blood sugars above target. One elevated fasting blood sugar that was not explained. Discussed factors that raise blood sugar. Patient is testing her urine for ketones in the morning with most results negative. She continues to drink a lot of water. For activity she is walking 20-30 minutes after dinner. Both Clara and her  Stephan are working together to document data and create meal plans. Praised them for their efforts.    Blood glucose out of target:   FB   Day she was not feeling well FB, 1 hour after dinner 150          PLAN:  1. Continue to check urine for ketones in the morning. Your goal is negative or trace ketones. Continue to drink a lot of water to help dilute the ketones.    2. Continue to check blood sugar 4 times per day. Before breakfast and 1 hour after meals.        Blood sugar goals:       Before breakfast: less 95      1 hour after meals: less 140      2 hours after meals: less 120    3. Continue to eat 3 meals and 3 snacks per day according to the meal plan.     4. Continue to stay active after meals to manage blood sugars.    5. Continue to keep a detailed food and blood sugar record and note ketone levels.     6. Next telephone follow-up in about 2 weeks on Tuesday, 2020 at 3:00 pm    7. Call Diabetes Care or email if you have 3 blood sugars above target in one  week.    Diabetes Care: [phone]      SUBJECTIVE/OBJECTIVE:  Clara Phillips is a 29 y.o. female referred by Dr. Marleny Zavaleta, [facility].     Weight:no recent weight    Pre-pregnancy weight:140 lb  GDM screen 227 (6/5/2020)  OGTT: Not done  CAESAR: Estimated Date of Delivery: 8/31/1010 32w3d. Having a GIRL!  Pregnancy #: 1  Previous GDM: No    BG monitoring goals: Fasting <95; 1 hour post start of meal <140. Test 4 times per day.  Check fasting a.m. ketones: Yes  GDM meal plan/carb counting taught per guidelines: Yes    EDUCATION RECORD    Gestational Diabetes Disease Process/Preconception Care/Management During Pregnancy/Postpartum:Discussed and Literature provided    Meter (per above goals): Competent    Nutrition Management    Weight: Competent  Portions/Balance: Assessed and Discussed  Carb ID/Count: Assessed and Discussed. Lots of nutrition questions  Label Reading: Competent     Physical Activity: Assessed and Discussed.     Medications: Not addressed    Acute Complications: Prevent, Detect, Treat:    Hypoglycemia: Not addressed  Hyperglycemia: Discussed factors that raise blood sugar  Goal Setting and Problem Solving: Assessed and Discussed and Literature provided  Barriers: Assessed  Psychosocial Adjustments: Assessed    Time: 30 Minutes  Visit Type: GDM Individual Follow-up. Diabetes Self-Management Training ()  Provider's Diagnosis:  Diet controlled gestational diabetes in third trimester (024.410)    Brigida Martinez RD, LD, CDCES  Certified Diabetes Care and   7/9/2020

## 2021-06-29 NOTE — PROGRESS NOTES
Progress Notes by Brigida Martinez RD, CDE at 6/24/2020  3:00 PM     Author: Brigida Martinez RD, CDE Service: -- Author Type: Diabetes Ed    Filed: 6/24/2020  4:57 PM Encounter Date: 6/24/2020 Status: Signed    : Brigida Martinez RD, ANDREWE (Registered Dietitian)       Diabetes Educator has received verbal consent for a telephone visit for the patient.    GESTATIONAL DIABETES CARE PLAN-FOLLOW-UP #1    ASSESSMENT/PLAN:   One week follow-up telephone visit for gestational diabetes education and counseling. Clara is testing her blood sugar 4 times per day. The majority of blood sugars are within target. The elevated blood sugars were explained. Clara and her  are working together to plan meals and keep detailed food and blood sugar records. They are learning which food combinations work to manage blood sugars. She is eating 30 grams of carbohydrate at all meals. Explained that lunch and dinner carbohydrate should be 45-60 grams. Discussed menu options for meals and the bedtime snack. Patient is testing her urine for ketones in the morning with most results negative and trace. Encouraged her to eat more at her bedtime snack. Clara is drinking 64 ounces of water per day. Weight stable the past week.        Blood glucose out of target: 3 elevated blood sugars the past week   after breakfast. Elevated due to eating carbohydrate without protein  , 128 (2 hours after lunch: target less 120) due to being sedentary after those meals    PLAN  1. Continue to check urine for ketones in the morning. Your goal is negative or trace ketones. If you have ketones in your urine it means you are not eating enough before you go to bed. Eat a larger bedtime snack and include protein. Remember that ketones are not good for your baby. Drinking water during the day helps to dilute ketones.    2. Continue to check blood sugar 4 times per day. Before breakfast and 1 hour after meals.        Blood sugar goals:        Before breakfast: less 95      1 hour after meals: less 140      2 hours after meals: less 120    3. Continue to eat 3 meals and 3 snacks per day according to the meal plan.   Breakfast: 30-45 grams of carbohydrate with protein   Lunch and dinner: 45-60 grams of carbohydrate  Snacks: 15-30 grams of carbohydrate with protein    4. Continue to stay active after meals to manage blood sugars.     5. Keep a detailed food and blood sugar record and note ketone levels.     6. Next follow-up in 2 weeks on Thursday, July 9th at 3:00 pm.    7. Call Diabetes Care or send a [employer] message if you have 3 blood sugars above target in one week or for any questions.    Diabetes Care: [phone]    SUBJECTIVE/OBJECTIVE:  Clara Phillips is a 29 y.o. female referred by Dr. Teri Mckeon, [facility].  Stephan also joined in on the telephone visit    Weight: 151.2 lb at home (stable)    Pre-pregnancy weight:140 lb    GDM screen 227 (6/5/2020)  OGTT: Not done  CAESAR: Estimated Date of Delivery: 8/31/2020 29w5d  Pregnancy #: 1 (knows they are having a GIRL  Previous GDM: No    BG monitoring goals: Fasting <95; 1 hour post start of meal <140. Test 4 times per day.  Check fasting a.m. ketones: Yes  GDM meal plan/carb counting taught per guidelines: Yes    EDUCATION RECORD    Gestational Diabetes Disease Process/Preconception Care/Management During Pregnancy/Postpartum:Discussed and Literature provided    Meter (per above goals): Competent.     Nutrition Management    Weight: Assessed and Discussed and Competent  Portions/Balance: Assessed and Discussed  Carb ID/Count: Assessed and Discussed  Label Reading: Assessed and Competent     Physical Activity: Discussed    Medications: Not addressed    Acute Complications: Prevent, Detect, Treat:    Hypoglycemia: Not addressed  Hyperglycemia: Discussed  Goal Setting and Problem Solving: Assessed and Discussed and Literature provided  Barriers:  Assessed  Psychosocial Adjustments: Assessed    Time: 30 Minutes  Visit Type: GDM Individual Follow-up. Diabetes Self-Management Training ()  Provider's Diagnosis: Gestational diabetes in 3rd trimester (024.410)    Brigida Martinez RD, LD, [facility]  Certified Diabetes Care and   6/24/2020

## 2021-07-03 NOTE — ADDENDUM NOTE
Addendum Note by Letha Carrasco RN at 8/14/2020  5:25 PM     Author: Letha Carrasco RN Service: -- Author Type: Registered Nurse    Filed: 8/17/2020 10:59 AM Encounter Date: 8/14/2020 Status: Signed    : Letha Carrasco RN (Registered Nurse)    Addended by: LETHA CARRASCO on: 8/17/2020 10:59 AM        Modules accepted: Orders

## 2021-07-06 VITALS
SYSTOLIC BLOOD PRESSURE: 110 MMHG | TEMPERATURE: 98.9 F | HEART RATE: 80 BPM | BODY MASS INDEX: 27.78 KG/M2 | WEIGHT: 162.7 LBS | DIASTOLIC BLOOD PRESSURE: 80 MMHG | OXYGEN SATURATION: 99 % | HEIGHT: 64 IN

## 2021-10-17 ENCOUNTER — HEALTH MAINTENANCE LETTER (OUTPATIENT)
Age: 31
End: 2021-10-17

## 2021-11-29 ENCOUNTER — OFFICE VISIT (OUTPATIENT)
Dept: FAMILY MEDICINE | Facility: CLINIC | Age: 31
End: 2021-11-29
Payer: COMMERCIAL

## 2021-11-29 VITALS
OXYGEN SATURATION: 99 % | DIASTOLIC BLOOD PRESSURE: 84 MMHG | SYSTOLIC BLOOD PRESSURE: 122 MMHG | BODY MASS INDEX: 26.63 KG/M2 | WEIGHT: 156 LBS | TEMPERATURE: 98.1 F | HEIGHT: 64 IN

## 2021-11-29 DIAGNOSIS — Z86.32 H/O GESTATIONAL DIABETES MELLITUS, NOT CURRENTLY PREGNANT: ICD-10-CM

## 2021-11-29 DIAGNOSIS — E55.9 VITAMIN D DEFICIENCY: ICD-10-CM

## 2021-11-29 DIAGNOSIS — Z23 NEED FOR COVID-19 VACCINE: ICD-10-CM

## 2021-11-29 DIAGNOSIS — R73.03 PREDIABETES: ICD-10-CM

## 2021-11-29 DIAGNOSIS — Z11.51 SCREENING FOR HUMAN PAPILLOMAVIRUS: ICD-10-CM

## 2021-11-29 DIAGNOSIS — Z00.01 ENCOUNTER FOR ROUTINE ADULT MEDICAL EXAM WITH ABNORMAL FINDINGS: Primary | ICD-10-CM

## 2021-11-29 DIAGNOSIS — Z12.4 SCREENING FOR CERVICAL CANCER: ICD-10-CM

## 2021-11-29 DIAGNOSIS — Z13.228 SCREENING FOR METABOLIC DISORDER: ICD-10-CM

## 2021-11-29 PROBLEM — Z34.90 PREGNANT: Status: RESOLVED | Noted: 2020-08-18 | Resolved: 2021-11-29

## 2021-11-29 LAB
CHOLEST SERPL-MCNC: 165 MG/DL
FASTING STATUS PATIENT QL REPORTED: ABNORMAL
HBA1C MFR BLD: 6.4 % (ref 0–5.6)
HDLC SERPL-MCNC: 43 MG/DL
LDLC SERPL CALC-MCNC: 98 MG/DL
TRIGL SERPL-MCNC: 120 MG/DL
TSH SERPL DL<=0.005 MIU/L-ACNC: 1.26 UIU/ML (ref 0.3–5)

## 2021-11-29 PROCEDURE — 91300 COVID-19,PF,PFIZER (12+ YRS): CPT | Performed by: FAMILY MEDICINE

## 2021-11-29 PROCEDURE — G0123 SCREEN CERV/VAG THIN LAYER: HCPCS | Performed by: FAMILY MEDICINE

## 2021-11-29 PROCEDURE — 87624 HPV HI-RISK TYP POOLED RSLT: CPT | Performed by: FAMILY MEDICINE

## 2021-11-29 PROCEDURE — 80061 LIPID PANEL: CPT | Performed by: FAMILY MEDICINE

## 2021-11-29 PROCEDURE — 82306 VITAMIN D 25 HYDROXY: CPT | Performed by: FAMILY MEDICINE

## 2021-11-29 PROCEDURE — 83036 HEMOGLOBIN GLYCOSYLATED A1C: CPT | Performed by: FAMILY MEDICINE

## 2021-11-29 PROCEDURE — 84443 ASSAY THYROID STIM HORMONE: CPT | Performed by: FAMILY MEDICINE

## 2021-11-29 PROCEDURE — 0004A COVID-19,PF,PFIZER (12+ YRS): CPT | Performed by: FAMILY MEDICINE

## 2021-11-29 PROCEDURE — 90471 IMMUNIZATION ADMIN: CPT | Performed by: FAMILY MEDICINE

## 2021-11-29 PROCEDURE — 36415 COLL VENOUS BLD VENIPUNCTURE: CPT | Performed by: FAMILY MEDICINE

## 2021-11-29 PROCEDURE — 90686 IIV4 VACC NO PRSV 0.5 ML IM: CPT | Performed by: FAMILY MEDICINE

## 2021-11-29 PROCEDURE — 99213 OFFICE O/P EST LOW 20 MIN: CPT | Mod: 25 | Performed by: FAMILY MEDICINE

## 2021-11-29 PROCEDURE — 99395 PREV VISIT EST AGE 18-39: CPT | Mod: 25 | Performed by: FAMILY MEDICINE

## 2021-11-29 RX ORDER — METFORMIN HCL 500 MG
500 TABLET, EXTENDED RELEASE 24 HR ORAL
Qty: 90 TABLET | Refills: 4 | Status: SHIPPED | OUTPATIENT
Start: 2021-11-29 | End: 2023-06-28

## 2021-11-29 ASSESSMENT — MIFFLIN-ST. JEOR: SCORE: 1407.61

## 2021-11-29 NOTE — PROGRESS NOTES
SUBJECTIVE:   CC: [id number]  who presents for preventive health visit.       Patient has been advised of split billing requirements and indicates understanding: Yes    Healthy Habits:     Getting at least 3 servings of Calcium per day:  Yes    Bi-annual eye exam:  Yes    Dental care twice a year:  NO    Sleep apnea or symptoms of sleep apnea:  None    Diet:  Regular (no restrictions)    Frequency of exercise:  None    Taking medications regularly:  Yes    PHQ-2 Total Score: 1    Additional concerns today:  No          PROBLEMS TO ADD ON... 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     Today's PHQ-2 Score:   PHQ-2 ( 1999 Pfizer) 11/29/2021   Q1: Little interest or pleasure in doing things 1   Q2: Feeling down, depressed or hopeless 0   PHQ-2 Score 1   Q1: Little interest or pleasure in doing things Several days   Q2: Feeling down, depressed or hopeless Not at all   PHQ-2 Score 1       Abuse: Current or Past (Physical, Sexual or Emotional) - No  Do you feel safe in your environment? Yes    Have you ever done Advance Care Planning? (For example, a Health Directive, POLST, or a discussion with a medical provider or your loved ones about your wishes): No, advance care planning information given to patient to review.  Patient declined advance care planning discussion at this time.    Social History     Tobacco Use     Smoking status: Never Smoker     Smokeless tobacco: Never Used   Substance Use Topics     Alcohol use: Never           ASSESSMENT/PLAN:   Clara was seen today for physical.    Diagnoses and all orders for this visit:    Encounter for routine adult medical exam with abnormal findings    Screening for cervical cancer  -     Pap Screen with HPV - recommended age 30 - 65 years    Screening for human  "papillomavirus  -     Pap Screen with HPV - recommended age 30 - 65 years    Vitamin D deficiency  -     Vitamin D Deficiency    Screening for metabolic disorder  -     TSH  -     Lipid Profile    H/O gestational diabetes mellitus, not currently pregnant  We advised patient that after the labs we will figure it out if you are not prediabetic or diabetic and we will provide the guidance based on the result meanwhile she should work on some form of daily exercise which could be just walking and focus on eating healthy diet which is high in fresh fruits and vegetables and avoid any added sugars and process carbs and sweets  -     Hemoglobin A1c    Need for COVID-19 vaccine  -     COVID-19,PF,PFIZER (12+ YRS)    Other orders  -     REVIEW OF HEALTH MAINTENANCE PROTOCOL ORDERS  -     INFLUENZA VACCINE IM > 6 MONTHS VALENT IIV4 (AFLURIA/FLUZONE)        Patient has been advised of split billing requirements and indicates understanding: Yes    COUNSELING:  Reviewed preventive health counseling, as reflected in patient instructions       Regular exercise       Healthy diet/nutrition       Vision screening       Hearing screening       Contraception       Advance Care Planning    Estimated body mass index is 26.78 kg/m  as calculated from the following:    Height as of this encounter: 1.626 m (5' 4\").    Weight as of this encounter: 70.8 kg (156 lb).    Weight management plan: Discussed healthy diet and exercise guidelines    She reports that she has never smoked. She has never used smokeless tobacco.      Reviewed orders with patient.  Reviewed health maintenance and updated orders accordingly - Yes  Lab work is in process  Labs reviewed in EPIC  BP Readings from Last 3 Encounters:   11/29/21 122/84   06/11/21 110/80    Wt Readings from Last 3 Encounters:   11/29/21 70.8 kg (156 lb)   06/11/21 73.8 kg (162 lb 11.2 oz)   02/18/19 61.9 kg (136 lb 6.4 oz)                 Patient Active Problem List   Diagnosis     TB lung, " latent     Vitamin D deficiency     H/O gestational diabetes mellitus, not currently pregnant     Pregnant     Past Surgical History:   Procedure Laterality Date     NO PAST SURGERIES         Social History     Tobacco Use     Smoking status: Never Smoker     Smokeless tobacco: Never Used   Substance Use Topics     Alcohol use: Never     Family History   Problem Relation Age of Onset     Hypertension Mother      Hypertension Father      No Known Problems Brother            Current Outpatient Medications   Medication Sig Dispense Refill     cholecalciferol, vitamin D3, 1,000 unit (25 mcg) tablet [CHOLECALCIFEROL, VITAMIN D3, 1,000 UNIT (25 MCG) TABLET] Take 2,500 Units by mouth daily.       clotrimazole-betamethasone (LOTRISONE) cream [CLOTRIMAZOLE-BETAMETHASONE (LOTRISONE) CREAM] Apply to affected area 2 times daily (Patient not taking: Reported on 11/29/2021) 15 g 1     zinc oxide 20 % ointment [ZINC OXIDE 20 % OINTMENT] Apply to affected area to protect the skin after applying Lotrisone (Patient not taking: Reported on 11/29/2021) 56.7 g 0     No Known Allergies    Recent Labs   Lab Test 02/18/19  0824   LDL 84   HDL 50   TRIG 42   ALT 11   CR 0.70   GFRESTIMATED >60   GFRESTBLACK >60   POTASSIUM 4.2        Breast Cancer Screening:  Any new diagnosis of family breast, ovarian, or bowel cancer? No    Pertinent mammograms are reviewed under the imaging tab.    History of abnormal Pap smear: NO - age 30-65 PAP every 5 years with negative HPV co-testing recommended  PAP / HPV Latest Ref Rng & Units 2/18/2019   PAP Negative for squamous intraepithelial lesion or malignancy. Negative for squamous intraepithelial lesion or malignancy  Electronically signed by Cornelio Garza CT (ASCP) on 2/20/2019 at 12:37 PM       Reviewed and updated as needed this visit by clinical staff  Tobacco  Allergies  Meds  Problems  Med Hx  Surg Hx  Fam Hx         Reviewed and updated as needed this visit by Provider  Tobacco   "Allergies  Meds  Problems  Med Hx  Surg Hx  Fam Hx        Past Medical History:   Diagnosis Date     Diet controlled gestational diabetes mellitus (GDM) in third trimester 6/16/2020      Past Surgical History:   Procedure Laterality Date     NO PAST SURGERIES         Review of Systems       OBJECTIVE:   /84 (BP Location: Right arm, Patient Position: Sitting, Cuff Size: Adult Regular)   Temp 98.1  F (36.7  C)   Ht 1.626 m (5' 4\")   Wt 70.8 kg (156 lb)   LMP 10/24/2021   SpO2 99%   BMI 26.78 kg/m    Physical Exam  GENERAL: healthy, alert and no distress  EYES: Eyes grossly normal to inspection, PERRL and conjunctivae and sclerae normal  HENT: ear canals and TM's normal, nose and mouth without ulcers or lesions  NECK: no adenopathy, no asymmetry, masses, or scars and thyroid normal to palpation  RESP: lungs clear to auscultation - no rales, rhonchi or wheezes  BREAST: normal without masses, tenderness or nipple discharge and no palpable axillary masses or adenopathy  CV: regular rate and rhythm, normal S1 S2, no S3 or S4, no murmur, click or rub, no peripheral edema and peripheral pulses strong  ABDOMEN: soft, nontender, no hepatosplenomegaly, no masses and bowel sounds normal   (female): normal female external genitalia, normal urethral meatus , vaginal mucosa pink, moist, well rugated and normal cervix, adnexae, and uterus without masses.  PAP done today  MS: no gross musculoskeletal defects noted, no edema  SKIN: no suspicious lesions or rashes  PSYCH: mentation appears normal, affect normal/bright    Diagnostic Test Results:  Labs reviewed in [facility]      Counseling Resources:  ATP IV Guidelines  Pooled Cohorts Equation Calculator  Breast Cancer Risk Calculator  BRCA-Related Cancer Risk Assessment: FHS-7 Tool  FRAX Risk Assessment  ICSI Preventive Guidelines  Dietary Guidelines for Americans, 2010  USDA's MyPlate  ASA Prophylaxis  Lung CA Screening    Blanquita Simmons MD  [facility] " [facility]

## 2021-11-29 NOTE — PATIENT INSTRUCTIONS
Preventive Health Recommendations  Female Ages 26 - 39  Yearly exam:   See your health care provider every year in order to    Review health changes.     Discuss preventive care.      Review your medicines if you your doctor has prescribed any.    Until age 30: Get a Pap test every three years (more often if you have had an abnormal result).    After age 30: Talk to your doctor about whether you should have a Pap test every 3 years or have a Pap test with HPV screening every 5 years.   You do not need a Pap test if your uterus was removed (hysterectomy) and you have not had cancer.  You should be tested each year for STDs (sexually transmitted diseases), if you're at risk.   Talk to your provider about how often to have your cholesterol checked.  If you are at risk for diabetes, you should have a diabetes test (fasting glucose).  Shots: Get a flu shot each year. Get a tetanus shot every 10 years.   Nutrition:     Eat at least 5 servings of fruits and vegetables each day.    Eat whole-grain bread, whole-wheat pasta and brown rice instead of white grains and rice.    Get adequate Calcium and Vitamin D.     Lifestyle    Exercise at least 150 minutes a week (30 minutes a day, 5 days of the week). This will help you control your weight and prevent disease.    Limit alcohol to one drink per day.    No smoking.     Wear sunscreen to prevent skin cancer.    See your dentist every six months for an exam and cleaning.    Patient Education     Prediabetes  You have been diagnosed with prediabetes. This means that the level of sugar (glucose) in your blood is too high. If you have prediabetes, you are at risk for developing type 2 diabetes. Type 2 diabetes is diagnosed when the level of glucose in the blood reaches a certain high level. With prediabetes, it hasn t reached this point yet. But it's higher than normal. It is vital to make lifestyle changes to lower your blood sugar, improve your health, and prevent diabetes.        Why worry about prediabetes?  Prediabetes is a condition where the body s cells have trouble using glucose in the blood for energy. As a result, too much glucose stays in the blood. This can affect how your heart and blood vessels work. Without changes in diet and lifestyle, the problem can get worse. Once you have type 2 diabetes, it's ongoing (chronic). It needs to be managed for the rest of your life. Diabetes can harm the body and your health by damaging organs, such as your eyes and kidneys. It makes you more likely to have heart disease. And it can damage nerves and blood vessels.   Who is a risk for prediabetes?  The exact cause of prediabetes is not clear. But certain risk factors make a person more likely to have it. These include:     A family history of type 2 diabetes    Being overweight    Being older than age 45    Have high blood pressure or high cholesterol     Having had gestational diabetes    Not being physically active    Being ,  American, , , , or   Diagnosing prediabetes  Prediabetes may have no symptoms. Or you may have some of the symptoms of diabetes (see below). The diagnosis is made with a blood test. You may have 1 or more of these blood tests:      Fasting glucose test. Blood is taken and tested after you have fasted (not eaten) for at least 8 hours. A normal test result is 99 milligrams per deciliter (mg/dL) or lower. Prediabetes is 100 mg/dL to 125 mg/dL. Diabetes is 126 mg/dL or higher.    Glucose tolerance test. Your blood sugar is measured before and after you drink a very sugary liquid. A normal test result is 139 milligrams per deciliter (mg/dL) or lower. Prediabetes is 140 mg/dL to 199 mg/dL. Diabetes is 200 mg/dL or higher.    Hemoglobin A1c (HbA1c). Your HbA1c is normal if it is below 5.7%. Prediabetes is 5.7% to 6.4%. Diabetes is 6.5% or higher.   Treating prediabetes  The best way to treat prediabetes  is to lose at least 5% to 7% of your current weight and be more physically active by getting at least 150 minutes a week of physical activity (at least 30 minutes daily.) When sitting for long periods of time, get up for short sessions of light activity every 30 minutes. These changes help the body s cells use blood sugar better. Even a small amount of weight loss can help. Work with your healthcare provider to make a plan to eat well and be more active. Keep in mind that small changes can add up. Other changes in your lifestyle (or even taking certain medicines, such as metformin) may make you less likely to develop diabetes. Your provider can talk with you about these. Stopping smoking will decrease your risk of developing diabetes. Don't use e-cigarettes or vaping products. But you may gain some weight if you are not careful.   Ask your healthcare provider for a referral to a lifestyle intervention program. This program will help you get to and stay at a 7% weight loss and increase physical activity.   Follow-up  If not treated, prediabetes can turn into diabetes. This is a serious health condition. Take steps to stop this from happening. Follow the treatment plan you have been given. You may have your blood glucose tested again in about 12 to 18 months.   Diabetes symptoms   Let your healthcare provider know if you have any of the following:    Always feel very tired    Feel very thirsty or hungry much of the time    Have to urinate often    Lose weight for no reason    Feel numbness or tingling in your fingers or toes    Have cuts or bruises that don t seem to heal    Have blurry vision  [employer] last reviewed this educational content on 6/1/2019 2000-2021 [employer]. All rights reserved. This information is not intended as a substitute for professional medical care. Always follow your healthcare professional's instructions.

## 2021-11-30 LAB — DEPRECATED CALCIDIOL+CALCIFEROL SERPL-MC: 25 UG/L (ref 30–80)

## 2021-12-01 ENCOUNTER — MYC MEDICAL ADVICE (OUTPATIENT)
Dept: FAMILY MEDICINE | Facility: CLINIC | Age: 31
End: 2021-12-01
Payer: COMMERCIAL

## 2021-12-01 LAB
HUMAN PAPILLOMA VIRUS 16 DNA: NEGATIVE
HUMAN PAPILLOMA VIRUS 18 DNA: NEGATIVE
HUMAN PAPILLOMA VIRUS FINAL DIAGNOSIS: NORMAL
HUMAN PAPILLOMA VIRUS OTHER HR: NEGATIVE

## 2021-12-06 LAB
BKR LAB AP GYN ADEQUACY: NORMAL
BKR LAB AP GYN INTERPRETATION: NORMAL
BKR LAB AP HPV REFLEX: NORMAL
BKR LAB AP PREVIOUS ABNORMAL: NORMAL
PATH REPORT.COMMENTS IMP SPEC: NORMAL
PATH REPORT.COMMENTS IMP SPEC: NORMAL
PATH REPORT.RELEVANT HX SPEC: NORMAL

## 2022-10-01 ENCOUNTER — HEALTH MAINTENANCE LETTER (OUTPATIENT)
Age: 32
End: 2022-10-01

## 2022-12-13 ENCOUNTER — OFFICE VISIT (OUTPATIENT)
Dept: FAMILY MEDICINE | Facility: CLINIC | Age: 32
End: 2022-12-13
Payer: COMMERCIAL

## 2022-12-13 VITALS
HEART RATE: 98 BPM | SYSTOLIC BLOOD PRESSURE: 110 MMHG | BODY MASS INDEX: 24.59 KG/M2 | OXYGEN SATURATION: 99 % | DIASTOLIC BLOOD PRESSURE: 80 MMHG | WEIGHT: 144 LBS | HEIGHT: 64 IN

## 2022-12-13 DIAGNOSIS — E55.9 VITAMIN D DEFICIENCY: ICD-10-CM

## 2022-12-13 DIAGNOSIS — Z00.01 ENCOUNTER FOR ROUTINE ADULT MEDICAL EXAM WITH ABNORMAL FINDINGS: Primary | ICD-10-CM

## 2022-12-13 DIAGNOSIS — R73.03 PREDIABETES: ICD-10-CM

## 2022-12-13 DIAGNOSIS — R06.83 HABITUAL SNORING: ICD-10-CM

## 2022-12-13 DIAGNOSIS — Z13.228 SCREENING FOR METABOLIC DISORDER: ICD-10-CM

## 2022-12-13 LAB
CHOLEST SERPL-MCNC: 146 MG/DL
DEPRECATED CALCIDIOL+CALCIFEROL SERPL-MC: 17 UG/L (ref 20–75)
HBA1C MFR BLD: 5.9 % (ref 0–5.6)
HDLC SERPL-MCNC: 45 MG/DL
LDLC SERPL CALC-MCNC: 80 MG/DL
NONHDLC SERPL-MCNC: 101 MG/DL
TRIGL SERPL-MCNC: 104 MG/DL

## 2022-12-13 PROCEDURE — 36415 COLL VENOUS BLD VENIPUNCTURE: CPT | Performed by: FAMILY MEDICINE

## 2022-12-13 PROCEDURE — 99395 PREV VISIT EST AGE 18-39: CPT | Mod: 25 | Performed by: FAMILY MEDICINE

## 2022-12-13 PROCEDURE — 90471 IMMUNIZATION ADMIN: CPT | Performed by: FAMILY MEDICINE

## 2022-12-13 PROCEDURE — 80061 LIPID PANEL: CPT | Performed by: FAMILY MEDICINE

## 2022-12-13 PROCEDURE — 83036 HEMOGLOBIN GLYCOSYLATED A1C: CPT | Performed by: FAMILY MEDICINE

## 2022-12-13 PROCEDURE — 99213 OFFICE O/P EST LOW 20 MIN: CPT | Mod: 25 | Performed by: FAMILY MEDICINE

## 2022-12-13 PROCEDURE — 82306 VITAMIN D 25 HYDROXY: CPT | Performed by: FAMILY MEDICINE

## 2022-12-13 PROCEDURE — 90743 HEPB VACC 2 DOSE ADOLESC IM: CPT | Performed by: FAMILY MEDICINE

## 2022-12-13 ASSESSMENT — ENCOUNTER SYMPTOMS
EYE PAIN: 0
ARTHRALGIAS: 0
SORE THROAT: 0
NERVOUS/ANXIOUS: 0
HEADACHES: 0
NAUSEA: 0
MYALGIAS: 0
COUGH: 0
HEMATOCHEZIA: 0
HEARTBURN: 0
PALPITATIONS: 0
FEVER: 0
PARESTHESIAS: 0
DIARRHEA: 0
SHORTNESS OF BREATH: 0
HEMATURIA: 0
CHILLS: 0
CONSTIPATION: 0
DYSURIA: 0
FREQUENCY: 0
JOINT SWELLING: 0
ABDOMINAL PAIN: 0
DIZZINESS: 0
WEAKNESS: 0

## 2022-12-13 NOTE — PROGRESS NOTES
SUBJECTIVE:   CC: Clara Phillips 31 year old   who presents for preventive health visit.       Patient has been advised of split billing requirements and indicates understanding: Yes    I spent 15 minutes with the patient total  from the the prevent visit, >50% of which was in counseling regarding the patient's medical issues as noted above.      Healthy Habits:     Getting at least 3 servings of Calcium per day:  Yes    Bi-annual eye exam:  Yes    Dental care twice a year:  NO    Sleep apnea or symptoms of sleep apnea:  Excessive snoring    Diet:  Regular (no restrictions)    Frequency of exercise:  2-3 days/week    Duration of exercise:  15-30 minutes    Taking medications regularly:  Not Applicable    Medication side effects:  None    PHQ-2 Total Score: 0    Additional concerns today:  Yes        Wt Readings from Last 3 Encounters:   12/13/22 65.3 kg (144 lb)   11/29/21 70.8 kg (156 lb)   06/11/21 73.8 kg (162 lb 11.2 oz)           ASSESSMENT/PLAN:   Clara was seen today for physical.    Diagnoses and all orders for this visit:    Encounter for routine adult medical exam with abnormal findings    Vitamin D deficiency  -     25- [place]-Vitamin D; Future  -     25- [place]-Vitamin D    Screening for metabolic disorder  -     Hemoglobin A1c; Future  -     Lipid Profile; Future  -     Hemoglobin A1c  -     Lipid Profile    Prediabetes  Comments:  marked improvement in A1c .  Graduated on her efforts continue work with low-carb and sugar diet and exercise on daily basis, and take metformin more for prevention / or prior to heavy high sugar and fatty food     Habitual snoring  Comments:  patient was given option of sleep study, low risk for sleep apnea . or talk to her dentist to help with mouth applianace for snoring     Other orders  -     INFLUENZA VACCINE IM > 6 MONTHS VALENT IIV4 (AFLURIA/FLUZONE); Future  -     REVIEW OF HEALTH MAINTENANCE PROTOCOL ORDERS  -     COVID-19,PF,PFIZER BOOSTER BIVALENT  "(12+YRS); Future  -     HEPATITIS B VACCINE ADULT 2 DOSE IM (ENGRIX-B/RECOMBIVAX HB)        Patient has been advised of split billing requirements and indicates understanding: Yes    No flowsheet data found.     Today's PHQ-2 Score:   PHQ-2 ( 1999 Pfizer) 12/13/2022   Q1: Little interest or pleasure in doing things 0   Q2: Feeling down, depressed or hopeless 0   PHQ-2 Score 0   Q1: Little interest or pleasure in doing things Not at all   Q2: Feeling down, depressed or hopeless Not at all   PHQ-2 Score 0       Abuse: Current or Past (Physical, Sexual or Emotional) - No  Do you feel safe in your environment? Yes    Have you ever done Advance Care Planning? (For example, a Health Directive, POLST, or a discussion with a medical provider or your loved ones about your wishes): No, advance care planning information given to patient to review.  Patient declined advance care planning discussion at this time.    Social History     Tobacco Use     Smoking status: Never     Smokeless tobacco: Never   Substance Use Topics     Alcohol use: Never       COUNSELING:  Reviewed preventive health counseling, as reflected in patient instructions       Regular exercise       Healthy diet/nutrition       Vision screening       Hearing screening       Advance Care Planning    Estimated body mass index is 24.72 kg/m  as calculated from the following:    Height as of this encounter: 1.626 m (5' 4\").    Weight as of this encounter: 65.3 kg (144 lb).        She reports that she has never smoked. She has never used smokeless tobacco.      Reviewed orders with patient.  Reviewed health maintenance and updated orders accordingly - Yes  Lab work is in process  Labs reviewed in EPIC  BP Readings from Last 3 Encounters:   12/13/22 110/80   11/29/21 122/84   06/11/21 110/80    Wt Readings from Last 3 Encounters:   12/13/22 65.3 kg (144 lb)   11/29/21 70.8 kg (156 lb)   06/11/21 73.8 kg (162 lb 11.2 oz)                 Patient Active Problem List "   Diagnosis     TB lung, latent     Vitamin D deficiency     H/O gestational diabetes mellitus, not currently pregnant     Prediabetes     Habitual snoring     Past Surgical History:   Procedure Laterality Date     NO PAST SURGERIES         Social History     Tobacco Use     Smoking status: Never     Smokeless tobacco: Never   Substance Use Topics     Alcohol use: Never     Family History   Problem Relation Age of Onset     Hypertension Mother      Hypertension Father      No Known Problems Brother            Current Outpatient Medications   Medication Sig Dispense Refill     cholecalciferol, vitamin D3, 1,000 unit (25 mcg) tablet [CHOLECALCIFEROL, VITAMIN D3, 1,000 UNIT (25 MCG) TABLET] Take 2,500 Units by mouth daily.       metFORMIN (GLUCOPHAGE-XR) 500 MG 24 hr tablet Take 1 tablet (500 mg) by mouth daily (with dinner) (Patient not taking: Reported on 12/13/2022) 90 tablet 4     No Known Allergies    Recent Labs   Lab Test 12/13/22  0853 11/29/21  1013 02/18/19  0824   A1C 5.9* 6.4*  --    LDL  --  98 84   HDL  --  43* 50   TRIG  --  120 42   ALT  --   --  11   CR  --   --  0.70   GFRESTIMATED  --   --  >60   GFRESTBLACK  --   --  >60   POTASSIUM  --   --  4.2   TSH  --  1.26  --         Breast Cancer Screening:  Any new diagnosis of family breast, ovarian, or bowel cancer? No    Pertinent mammograms are reviewed under the imaging tab.    History of abnormal Pap smear: NO - age 30-65 PAP every 5 years with negative HPV co-testing recommended  PAP / HPV Latest Ref Rng & Units 11/29/2021 2/18/2019   PAP   Negative for Intraepithelial Lesion or Malignancy (NILM) Negative for squamous intraepithelial lesion or malignancy  Electronically signed by Cornelio Garza CT (ASCP) on 2/20/2019 at 12:37 PM     HPV16 Negative Negative -   HPV18 Negative Negative -   HRHPV Negative Negative -     Reviewed and updated as needed this visit by clinical staff   Tobacco  Allergies  Meds  Problems  Med Hx  Surg Hx  Fam Hx  "         Reviewed and updated as needed this visit by Provider   Tobacco  Allergies  Meds  Problems  Med Hx  Surg Hx  Fam Hx         Past Medical History:   Diagnosis Date     Diet controlled gestational diabetes mellitus (GDM) in third trimester 6/16/2020      Past Surgical History:   Procedure Laterality Date     NO PAST SURGERIES         Review of Systems   Constitutional: Negative for chills and fever.   HENT: Negative for congestion, ear pain, hearing loss and sore throat.    Eyes: Negative for pain and visual disturbance.   Respiratory: Negative for cough and shortness of breath.    Cardiovascular: Negative for chest pain, palpitations and peripheral edema.   Gastrointestinal: Negative for abdominal pain, constipation, diarrhea, heartburn, hematochezia and nausea.   Genitourinary: Negative for dysuria, frequency, genital sores, hematuria and urgency.   Musculoskeletal: Negative for arthralgias, joint swelling and myalgias.   Skin: Negative for rash.   Neurological: Negative for dizziness, weakness, headaches and paresthesias.   Psychiatric/Behavioral: Negative for mood changes. The patient is not nervous/anxious.           OBJECTIVE:   /80 (BP Location: Left arm, Patient Position: Sitting, Cuff Size: Adult Regular)   Pulse 98   Ht 1.626 m (5' 4\")   Wt 65.3 kg (144 lb)   LMP 11/28/2022 (Approximate)   SpO2 99%   BMI 24.72 kg/m    Physical Exam  GENERAL: healthy, alert and no distress  EYES: Eyes grossly normal to inspection, PERRL and conjunctivae and sclerae normal  HENT: ear canals and TM's normal, nose and mouth without ulcers or lesions  NECK: no adenopathy, no asymmetry, masses, or scars and thyroid normal to palpation  RESP: lungs clear to auscultation - no rales, rhonchi or wheezes  BREAST: normal without masses, tenderness or nipple discharge and no palpable axillary masses or adenopathy  CV: regular rate and rhythm, normal S1 S2, no S3 or S4, no murmur, click or rub, no peripheral " edema and peripheral pulses strong  ABDOMEN: soft, nontender, no hepatosplenomegaly, no masses and bowel sounds normal   (female): deferred  MS: no gross musculoskeletal defects noted, no edema  SKIN: no suspicious lesions or rashes  PSYCH: mentation appears normal, affect normal/bright    Diagnostic Test Results:  Labs reviewed in [facility]      Counseling Resources:  ATP IV Guidelines  Pooled Cohorts Equation Calculator  Breast Cancer Risk Calculator  BRCA-Related Cancer Risk Assessment: FHS-7 Tool  FRAX Risk Assessment  ICSI Preventive Guidelines  Dietary Guidelines for Americans, 2010  USDA's MyPlate  ASA Prophylaxis  Lung CA Screening    Blanquita Simmons MD  [facility]

## 2022-12-14 ENCOUNTER — MYC MEDICAL ADVICE (OUTPATIENT)
Dept: FAMILY MEDICINE | Facility: CLINIC | Age: 32
End: 2022-12-14

## 2022-12-14 NOTE — TELEPHONE ENCOUNTER
Sending to PCP for review.  Please review and advise on patient MyChart message.    Patient requesting recommendations for lab results of  Vitamin D screening, lipid and A1c of 12/13/2022    Bing Reed RN  [facility]

## 2023-06-26 ENCOUNTER — MYC MEDICAL ADVICE (OUTPATIENT)
Dept: FAMILY MEDICINE | Facility: CLINIC | Age: 33
End: 2023-06-26
Payer: COMMERCIAL

## 2023-06-28 ENCOUNTER — OFFICE VISIT (OUTPATIENT)
Dept: FAMILY MEDICINE | Facility: CLINIC | Age: 33
End: 2023-06-28
Payer: COMMERCIAL

## 2023-06-28 VITALS
HEIGHT: 64 IN | OXYGEN SATURATION: 99 % | HEART RATE: 97 BPM | RESPIRATION RATE: 12 BRPM | WEIGHT: 148.4 LBS | DIASTOLIC BLOOD PRESSURE: 70 MMHG | SYSTOLIC BLOOD PRESSURE: 118 MMHG | BODY MASS INDEX: 25.33 KG/M2 | TEMPERATURE: 98.2 F

## 2023-06-28 DIAGNOSIS — N63.25 MASS OVERLAPPING MULTIPLE QUADRANTS OF LEFT BREAST: ICD-10-CM

## 2023-06-28 DIAGNOSIS — N61.0 MASTITIS, ACUTE: Primary | ICD-10-CM

## 2023-06-28 DIAGNOSIS — R73.03 PREDIABETES: ICD-10-CM

## 2023-06-28 PROCEDURE — 99214 OFFICE O/P EST MOD 30 MIN: CPT | Performed by: FAMILY MEDICINE

## 2023-06-28 RX ORDER — CEPHALEXIN 500 MG/1
500 CAPSULE ORAL 3 TIMES DAILY
Qty: 21 CAPSULE | Refills: 0 | Status: SHIPPED | OUTPATIENT
Start: 2023-06-28 | End: 2023-07-05

## 2023-06-28 NOTE — PROGRESS NOTES
Assessment & Plan     Patient presents with:  Musculoskeletal Problem: Left breast pain and swelling since 6/22, worsening pain. Chills/fever.       Clara was seen today for musculoskeletal problem.    Diagnoses and all orders for this visit:    Mastitis, acute  Findings are quite consistent with mastitis with fibroadenoma quite tender to touch start with antibiotic and  600 mg of ibuprofen every 6 hours and icing  We will follow-up on the ultrasound  -     US Breast Left Limited 1-3 Quadrants; Future  -     cephALEXin (KEFLEX) 500 MG capsule; Take 1 capsule (500 mg) by mouth 3 times daily for 7 days    Mass overlapping multiple quadrants of left breast  -     US Breast Left Limited 1-3 Quadrants; Future    Prediabetes  Comments:  currently not taking metformine    Other orders  -     PRIMARY CARE FOLLOW-UP SCHEDULING; Future                No follow-ups on file.   Follow-up Visit   Expected date:  Dec 28, 2023 (Approximate)      Follow Up Appointment Details:     Follow-up with whom?: Me    Follow-Up for what?: Adult Preventive    How?: In Person                     Subjective   Clara Phillips 32 year old who presents for the following health issues           HPI       Breast Concern  Onset/Duration: left breast pain and swelling   Description:   Location: upper outer quadrant  Pain or tenderness: YES  Redness: YES  Intensity: moderate  Progression of Symptoms: same  Accompanying Signs & Symptoms:  Any lumps in axillary region: No  Movable: YES  Nipple discharge: no  Changes in the skin or nipple: redness  On Hormone therapy: No  Does it change with menstrual cycle: YES  Previous history of similar problem:   First degree relative with breast cancer: a negative family history for breast cancer.  Precipitating factors:           Worsened by:  Not sure if she had any injury   Alleviating factors:            Improved by: ice and tylenol  Therapies tried and outcome: support bra and ice  Patient's last menstrual  "period was 06/22/2023 (exact date).        Patient Active Problem List   Diagnosis     TB lung, latent     Vitamin D deficiency     H/O gestational diabetes mellitus, not currently pregnant     Prediabetes     Habitual snoring        Current Outpatient Medications   Medication     cephALEXin (KEFLEX) 500 MG capsule     cholecalciferol, vitamin D3, 1,000 unit (25 mcg) tablet     No current facility-administered medications for this visit.          Social Determinants of Health     Tobacco Use: Low Risk  (6/28/2023)    Patient History      Smoking Tobacco Use: Never      Smokeless Tobacco Use: Never      Passive Exposure: Not on file   Alcohol Use: Not on file   Financial Resource Strain: Not on file   Food Insecurity: Not on file   Transportation Needs: Not on file   Physical Activity: Not on file   Stress: Not on file   Social Connections: Not on file   Intimate Partner Violence: Not on file   Depression: Not at risk (6/28/2023)    PHQ-2      PHQ-2 Score: 0   Housing Stability: Not on file        Review of Systems   Constitutional, cardiovascular, pulmonary, GI, , musculoskeletal, neuro, skin, endocrine and psych systems are negative, except as otherwise noted.      Objective      /70 (BP Location: Left arm, Patient Position: Sitting, Cuff Size: Adult Regular)   Pulse 97   Temp 98.2  F (36.8  C) (Temporal)   Resp 12   Ht 1.626 m (5' 4\")   Wt 67.3 kg (148 lb 6.4 oz)   LMP 06/22/2023 (Exact Date)   SpO2 99%   BMI 25.47 kg/m       Physical Exam   GENERAL: healthy, alert and no distress  NECK: no adenopathy, no asymmetry, masses, or scars and thyroid normal to palpation  RESP: lungs clear to auscultation - no rales, rhonchi or wheezes  BREAST: erythema left, mass left breast right upper and middle  and tenderness left   CV: regular rate and rhythm, normal S1 S2, no S3 or S4, no murmur, click or rub, no peripheral edema and peripheral pulses strong  MS: no gross musculoskeletal defects noted, no " edema    Blanquita Simmons MD   [facility].  [phone]

## 2023-06-29 NOTE — TELEPHONE ENCOUNTER
Called radiology scheduling, they also called [facility]. She spoke with her manager. They will touch base with her provider and call Dr. Simmons back. Writer provided [facility] number [phone].

## 2023-06-29 NOTE — TELEPHONE ENCOUNTER
6-29-23  Radiology scheduling stated pt declined to  Have a Bilateral Mammogram done for Mastitis, per Radiology pt needs this done per protocol since shes over 29.  Pt declined to accept that feedback from scheduling at radiology and radiology is requesting Dr Simmons to call pt to explain this to patient so she understands   tasha

## 2023-06-29 NOTE — TELEPHONE ENCOUNTER
Please call radiology scheduler and have them just to the left breast ultrasound if some abnormality I think patient will be okay doing mammogram    Blanquita Simmons MD

## 2023-07-10 ENCOUNTER — HOSPITAL ENCOUNTER (OUTPATIENT)
Dept: MAMMOGRAPHY | Facility: CLINIC | Age: 33
Discharge: HOME | End: 2023-07-10
Attending: FAMILY MEDICINE
Payer: COMMERCIAL

## 2023-07-10 ENCOUNTER — MYC MEDICAL ADVICE (OUTPATIENT)
Dept: FAMILY MEDICINE | Facility: CLINIC | Age: 33
End: 2023-07-10
Payer: COMMERCIAL

## 2023-07-10 DIAGNOSIS — N61.0 MASTITIS, ACUTE: ICD-10-CM

## 2023-07-10 DIAGNOSIS — N63.25 MASS OVERLAPPING MULTIPLE QUADRANTS OF LEFT BREAST: ICD-10-CM

## 2023-07-10 PROCEDURE — 76642 ULTRASOUND BREAST LIMITED: CPT | Mod: LT

## 2023-07-10 PROCEDURE — 77066 DX MAMMO INCL CAD BI: CPT

## 2023-07-10 NOTE — PROGRESS NOTES
Radiologist, Dr Derick Norris, recommends left breast ultrasound guided needle biopsy.     While patient was at [facility], I scheduled pt for breast biopsy appt on Thursday, 7/13/23, arrival at 0745am for 0800am appt at [facility], [address] , [address], [place], [place]. [phone].  .     I reviewed the procedure and the written pre and post breast biopsy patient handouts with patient and gave patient the written pre and post biopsy patient handouts to take home.     Patient verbalizes understanding of procedure and appt location, date, and time. Calls welcomed.    Hali Bautista, RN, BSN, [facility]  [facility]

## 2023-07-10 NOTE — LETTER
Clara Phillips  [address] [address]            July 10, 2023  Date of Exam: 7/10/23    Dear Clara:    Thank you for your recent visit.    Breast Imaging Result: Based on your recent breast imaging, you have a suspicious area that usually requires a biopsy, at which time a small tissue sample would be taken from your breast.      Breast Density: Your breast imaging shows that you have dense breast tissue. This means you have a slightly higher risk of getting breast cancer. It also means your breast imaging will be harder to read, but it doesn't mean that breast imaging isn't useful. In fact, yearly breast imaging is even more important for individuals at higher risk. Additional testing may be warranted depending on your overall risk.    If you have already made these arrangements, please disregard this letter.    A report of your breast imaging results was sent to: Blanquita Simmons    Your breast imaging will become part of your medical file here at [facility] for at least 10 years. You are responsible for informing any new health care team or breast imaging facility of the date and location of this examination.    We appreciate the opportunity to participate in your health care.    Sincerely,  Derick Norris MD   [facility]

## 2023-07-11 ENCOUNTER — TELEPHONE (OUTPATIENT)
Dept: MAMMOGRAPHY | Facility: CLINIC | Age: 33
End: 2023-07-11
Payer: COMMERCIAL

## 2023-07-11 NOTE — TELEPHONE ENCOUNTER
"07/11/23, 0922am: After I talked with Dr Derick Norris about pt concerns in 7/10/23 report regarding the breast lump, and the biopsy recommendation, I called pt back to let pt know that Dr Norris will update the 7/10/23 imaging report to reflect the lump has been palpable since pain began, and that Dr Norris' recommendation for breast biopsy is the recommended option for this lump per Dr Norris.     Pt verbalizes understanding and will continue with breast biopsy appt on Thursday, 7/13/23, as planned. Pt verbalizes appreciation for assistance provided today.     Hali Bautista, RN, BSN, [facility]      07/11/23, 0757am: Pt calling.    Pt was seen yesterday at [facility]. Pt calling today to report that in the report is says pt \"now reports a palpable lump\" and pt states she has always felt the lump since the pain started and would like this to be changed in report. I will let the Radiologist know of patient's request.     Pt is also asking if having the biopsy as recommended yesterday is her only option since today the lump feels smaller to her. Pt reports it was more painful yesterday following mammogram, but pain is not worse and pt states she doesn't need Ibuprofen \"the pain is manageable\".     I will discuss this with Dr Norris, the Radiologist who saw pt yesterday and call pt back with recommendation.     Hali Bautista RN, BSN, [facility]  "

## 2023-07-13 ENCOUNTER — HOSPITAL ENCOUNTER (OUTPATIENT)
Dept: MAMMOGRAPHY | Facility: CLINIC | Age: 33
Discharge: HOME | End: 2023-07-13
Attending: FAMILY MEDICINE
Payer: COMMERCIAL

## 2023-07-13 DIAGNOSIS — N61.0 MASTITIS, ACUTE: ICD-10-CM

## 2023-07-13 DIAGNOSIS — N63.25 MASS OVERLAPPING MULTIPLE QUADRANTS OF LEFT BREAST: ICD-10-CM

## 2023-07-13 PROCEDURE — A4648 IMPLANTABLE TISSUE MARKER: HCPCS

## 2023-07-13 PROCEDURE — 999N000065 MA POST PROCEDURE LEFT

## 2023-07-13 PROCEDURE — 88305 TISSUE EXAM BY PATHOLOGIST: CPT | Mod: TC | Performed by: FAMILY MEDICINE

## 2023-07-13 PROCEDURE — 250N000009 HC RX 250: Performed by: FAMILY MEDICINE

## 2023-07-13 PROCEDURE — 88364 INSITU HYBRIDIZATION (FISH): CPT | Mod: TC | Performed by: FAMILY MEDICINE

## 2023-07-13 RX ADMIN — LIDOCAINE HYDROCHLORIDE 10 ML: 10; .005 INJECTION, SOLUTION EPIDURAL; INFILTRATION; INTRACAUDAL; PERINEURAL at 08:33

## 2023-07-13 RX ADMIN — LIDOCAINE HYDROCHLORIDE 10 ML: 10 SOLUTION INTRAVENOUS at 08:33

## 2023-07-17 ENCOUNTER — TELEPHONE (OUTPATIENT)
Dept: MAMMOGRAPHY | Facility: CLINIC | Age: 33
End: 2023-07-17
Payer: COMMERCIAL

## 2023-07-17 PROCEDURE — 88364 INSITU HYBRIDIZATION (FISH): CPT | Mod: 26 | Performed by: PATHOLOGY

## 2023-07-17 PROCEDURE — 88312 SPECIAL STAINS GROUP 1: CPT | Mod: 26 | Performed by: PATHOLOGY

## 2023-07-17 PROCEDURE — 88342 IMHCHEM/IMCYTCHM 1ST ANTB: CPT | Mod: 26 | Performed by: PATHOLOGY

## 2023-07-17 PROCEDURE — 88365 INSITU HYBRIDIZATION (FISH): CPT | Mod: 26 | Performed by: PATHOLOGY

## 2023-07-17 PROCEDURE — 88341 IMHCHEM/IMCYTCHM EA ADD ANTB: CPT | Mod: 26 | Performed by: PATHOLOGY

## 2023-07-17 PROCEDURE — 88305 TISSUE EXAM BY PATHOLOGIST: CPT | Mod: 26 | Performed by: PATHOLOGY

## 2023-07-17 NOTE — TELEPHONE ENCOUNTER
"I called pt following Dr Prisca Alvarez's (Radiologist) review of 23 left breast biopsy. I confirmed pt's identity using name and . Initial report diagnosis is mastitis, however, there are pending stains. I informed pt of these results and pending stains. Pt will be leaving [place] for 6wks, [place]. Pt states she will have access to [employer].     Pt states the lump has not decreased further in size since biopsy and area is painful, however, pt is unable to determine if pain, which comes and goes, is from lump or from biopsy of lump. Pt used Tylenol on Saturday, 7/15/23, for pain, but states none since and pt states she is not using Ibuprofen. Pt states the pain is \"4-5\" on 1-10 scale when it appears. Pt reports she notices pain if lifting, etc. Pt verbalizes no other concerns. Pt denies redness on breast or fever.     Near the end of my call to pt, we were disconnected and I attempted to reach her but call went straight to voicemail, so I left a message to call me back. I had further questions regarding the medication she would like refilled before traveling, and was able to reach pt at 3:23pm, and pt requests I ask Dr Simmons to call in a refill of the antibiotic for her to take on the trip in case she needs it. Pt does not recall name of medication.     I will route this note to PCP, Dr Blanquita Simmons, to inform PCP of this call and pending stains, and pt's request for medication.     Final Diagnosis   Left breast mass, 11:00, retroareolar  - Mastitis with background benign breast tissue   Electronically signed by Nadir Tom MD on 2023 at  2:24 PM   Comment  [facility]   The clinical history has been reviewed.  Histologic sections demonstrate acute on chronic mixed inflammation with granulation tissue, fat necrosis and focal granulomatous inflammation (pending GMS and AFB stains- addendum).  Kappa and lambda in situ hybridization studies demonstrate no evidence of clonality.  CK5 and " December 7, 2018     Patient: Johana Grant   YOB: 1960   Date of Visit: 12/7/2018       To Whom it May Concern:    Johana Grant is under my professional care  She was seen in my office on 12/7/2018  She is excused from work today  Able to resume work on Monday, 12/10/18  If you have any questions or concerns, please don't hesitate to call           Sincerely,          Candance Knife, MD        CC: No Recipients estrogen receptor demonstrate an unremarkable mosaic immunophenotype.  Calponin and p63 highlight unremarkable myoepithelial cells.     Dr. Kevin Galarza has reviewed this case and concurs with the diagnosis.     Blue ink is confirmed. Total formalin fixation time: 13 hours and 34 minutes            Hali Bautista RN, BSN, [facility]

## 2023-07-18 ENCOUNTER — TELEPHONE (OUTPATIENT)
Dept: MAMMOGRAPHY | Facility: CLINIC | Age: 33
End: 2023-07-18
Payer: COMMERCIAL

## 2023-07-18 LAB
PATH REPORT.ADDENDUM SPEC: NORMAL
PATH REPORT.COMMENTS IMP SPEC: NORMAL
PATH REPORT.FINAL DX SPEC: NORMAL
PATH REPORT.GROSS SPEC: NORMAL
PATH REPORT.MICROSCOPIC SPEC OTHER STN: NORMAL
PATH REPORT.RELEVANT HX SPEC: NORMAL
PHOTO IMAGE: NORMAL

## 2023-07-18 NOTE — TELEPHONE ENCOUNTER
Following Dr Derick Norris' review of 7/13/23 left breast biopsy pathology and images, I called pt to inform pt that pending stains are available and are negative, and diagnosis of mastitis is unchanged.      Addendum   The purpose of the addendum is to report results of additional studies.  GMS and AFB are negative for fungal organisms and acid-fast bacilli, respectively.  The diagnosis remains unchanged.   Addendum electronically signed by Nadir Tom MD on 7/18/2023 at  9:00 AM   Final Diagnosis   Left breast mass, 11:00, retroareolar  - Mastitis with background benign breast tissue   Electronically signed by Nadir Tom MD on 7/17/2023 at  2:24 PM   Comment  [facility]   The clinical history has been reviewed.  Histologic sections demonstrate acute on chronic mixed inflammation with granulation tissue, fat necrosis and focal granulomatous inflammation (pending GMS and AFB stains- addendum).  Kappa and lambda in situ hybridization studies demonstrate no evidence of clonality.  CK5 and estrogen receptor demonstrate an unremarkable mosaic immunophenotype.  Calponin and p63 highlight unremarkable myoepithelial cells.     Dr. Kevin Galarza has reviewed this case and concurs with the diagnosis.            The radiologist recommends clinical follow-up and annual screening at age 39.    I will route this message to Dr Blanquita Simmons. Pt appreciative of call today.     Hali Bautista, RN, BSN, [facility]

## 2023-09-05 ENCOUNTER — OFFICE VISIT (OUTPATIENT)
Dept: FAMILY MEDICINE | Facility: CLINIC | Age: 33
End: 2023-09-05
Payer: COMMERCIAL

## 2023-09-05 ENCOUNTER — TRANSCRIBE ORDERS (OUTPATIENT)
Dept: ONCOLOGY | Facility: CLINIC | Age: 33
End: 2023-09-05

## 2023-09-05 ENCOUNTER — PATIENT OUTREACH (OUTPATIENT)
Dept: ONCOLOGY | Facility: CLINIC | Age: 33
End: 2023-09-05

## 2023-09-05 ENCOUNTER — TRANSCRIBE ORDERS (OUTPATIENT)
Dept: OTHER | Age: 33
End: 2023-09-05

## 2023-09-05 VITALS
OXYGEN SATURATION: 100 % | SYSTOLIC BLOOD PRESSURE: 129 MMHG | TEMPERATURE: 98.2 F | RESPIRATION RATE: 16 BRPM | DIASTOLIC BLOOD PRESSURE: 86 MMHG | HEART RATE: 91 BPM | BODY MASS INDEX: 25.61 KG/M2 | WEIGHT: 150 LBS | HEIGHT: 64 IN

## 2023-09-05 DIAGNOSIS — N61.1 BREAST ABSCESS: Primary | ICD-10-CM

## 2023-09-05 DIAGNOSIS — N61.0 MASTITIS: Primary | ICD-10-CM

## 2023-09-05 PROCEDURE — 99203 OFFICE O/P NEW LOW 30 MIN: CPT | Performed by: NURSE PRACTITIONER

## 2023-09-05 RX ORDER — SULFAMETHOXAZOLE/TRIMETHOPRIM 800-160 MG
1 TABLET ORAL 2 TIMES DAILY
Qty: 20 TABLET | Refills: 0 | Status: SHIPPED | OUTPATIENT
Start: 2023-09-05 | End: 2023-09-15

## 2023-09-05 NOTE — PROGRESS NOTES
New Patient Oncology Nurse Navigator Note     Referring provider: Cici Guevara APRN CNP     Referring Clinic/Organization: [facility]     Referred to (specialty:) Breast Surgery     Requested provider (if applicable): Dr. Irasema Aceves     Date Referral Received: September 5, 2023     Evaluation for:  breast abscess     Clinical History (per Nurse review of records provided):      6/28/23 - Patient presented to primary care provider with findings consistent with mastitis with fibroadenoma in left breast. This was quite tender to touch. Keflex started.   Bilateral diagnostic mammograms and left breast ultrasound were performed on 7/10/23. Bilateral full-field digital diagnostic mammograms performed. The breasts are heterogeneously dense, which may obscure small masses. Images evaluated with the assistance of CAD. Breast tomosynthesis was used in interpretation. No suspicious findings in either breast.     ULTRASOUND FINDINGS: Targeted ultrasound evaluation of the left breast was performed by the technologist and the radiologist. At the 11:00 position the retroareolar breast, at the site of the patient's palpable concern, there is an oval, hypoechoic mass with indistinct margins measuring 0.9 x 0.9 x 1.0 cm. At the site of the patient's pain at the 2:00 position, 4 cm from the nipple, only normal breast tissue is seen.    7/13/23 -   Left breast mass, 11:00, retroareolar ultrasound-guided left needle core biopsy:  - Mastitis with background benign breast tissue    Records Location: See Bookmarked material     Writer received referral, reviewed for appropriate plan, and referral transferred to New Patient Scheduling for completion.

## 2023-09-05 NOTE — PROGRESS NOTES
"  Assessment & Plan     Breast abscess  Since patient does not seem to be responding to Augmentin I am going to change to Bactrim.  I do think patient needs to have this drained.  I placed a referral to general surgery/breast specialist.  Discussed with patient she does need to call to schedule this appointment.  - Adult General Surg Referral; Future  - sulfamethoxazole-trimethoprim (BACTRIM DS) 800-160 MG tablet; Take 1 tablet by mouth 2 times daily for 10 days             BMI:   Estimated body mass index is 25.73 kg/m  as calculated from the following:    Height as of this encounter: 1.626 m (5' 4.02\").    Weight as of this encounter: 68 kg (150 lb).           MALIK TALBOT CNP [facility]   Clara is a 32 year old, presenting for the following health issues:  Breast Pain (Breast pain and swelling since July.)      9/5/2023     9:22 AM   Additional Questions   Roomed by Neli ATWOOD       History of Present Illness       Reason for visit:  After biopsy have swelling n my breast and unbearable pin    She eats 0-1 servings of fruits and vegetables daily.She consumes 2 sweetened beverage(s) daily.She exercises with enough effort to increase her heart rate 9 or less minutes per day.  She exercises with enough effort to increase her heart rate 3 or less days per week.   She is taking medications regularly.   Even if moves a little bit it stings.    Patient was in [place] and had an ultrasound. ultrasound report showed abscess. Has been on augmentin for since.  She was recommended to have it drained at that time but due to travel she did not want to have this procedure done.        Review of Systems         Objective    /86 (BP Location: Right arm, Patient Position: Sitting, Cuff Size: Adult Regular)   Pulse 91   Temp 98.2  F (36.8  C) (Oral)   Resp 16   Ht 1.626 m (5' 4.02\")   Wt 68 kg (150 lb)   SpO2 100%   BMI 25.73 kg/m    Body mass index is 25.73 " kg/m .  Physical Exam  Constitutional:       Appearance: Normal appearance.   Chest:   Breasts:     Left: Swelling and tenderness present.      Comments: Left breast with significant erythema.  Exam limited due to pain.  Boggy area felt on exam at about 12 o'clock position  Neurological:      General: No focal deficit present.      Mental Status: She is alert and oriented to person, place, and time.   Psychiatric:         Mood and Affect: Mood normal.         Behavior: Behavior normal.

## 2023-09-07 ENCOUNTER — OFFICE VISIT (OUTPATIENT)
Dept: SURGERY | Facility: CLINIC | Age: 33
End: 2023-09-07
Attending: SPECIALIST
Payer: COMMERCIAL

## 2023-09-07 DIAGNOSIS — N61.1 BREAST ABSCESS: ICD-10-CM

## 2023-09-07 PROCEDURE — 10060 I&D ABSCESS SIMPLE/SINGLE: CPT | Performed by: SPECIALIST

## 2023-09-07 PROCEDURE — 87070 CULTURE OTHR SPECIMN AEROBIC: CPT | Performed by: SPECIALIST

## 2023-09-07 PROCEDURE — 10060 I&D ABSCESS SIMPLE/SINGLE: CPT | Mod: LT | Performed by: SPECIALIST

## 2023-09-07 PROCEDURE — 87077 CULTURE AEROBIC IDENTIFY: CPT | Performed by: SPECIALIST

## 2023-09-07 PROCEDURE — G0463 HOSPITAL OUTPT CLINIC VISIT: HCPCS | Performed by: SPECIALIST

## 2023-09-07 PROCEDURE — 99203 OFFICE O/P NEW LOW 30 MIN: CPT | Mod: 25 | Performed by: SPECIALIST

## 2023-09-07 NOTE — NURSING NOTE
Clara  presents to [facility] today for a surgical consult with Dr. Collins  regarding mastitis.  This has been ongoing for about 2.5 mos.  She is currently on bactrim. She has had imaging, including biopsy, see report for details.  RN assessment and EMR update.  Patient met with Dr. Collins .  See dictation for details of visit. She will plan to come in tomorrow at 1100 for RN visit, dressing change.  Made her an appointment to see Dr. Collins on 9-19 for follow up.  Support provided, invited calls.

## 2023-09-07 NOTE — PROGRESS NOTES
This is a 32-year-old woman who presents for evaluation of a left breast abscess.  The patient first started having issues about a month or so ago.  Had pain and swelling in the left breast.  She was started on antibiotics and had improvement.  She then did a mammogram and ultrasound.  They did a core needle biopsy that did show evidence of mastitis.  There were some granulomas seen but very minimal.  They did stains for acid-fast bacteria that also came back negative.  The patient then went on a trip to [facility] and while she was there had a recurrence of her symptoms.  She was started on antibiotics and there had an ultrasound that clearly showed an abscess.  She did not want anything done there however and came back home.  She presents now with increasing pain and swelling in the left breast.  The patient stopped nursing about 2 years ago.    Past Medical History:   Diagnosis Date    Diet controlled gestational diabetes mellitus (GDM) in third trimester 6/16/2020     Current Outpatient Medications   Medication Instructions    sulfamethoxazole-trimethoprim (BACTRIM DS) 800-160 MG tablet 1 tablet, Oral, 2 TIMES DAILY    Vitamin D3 (CHOLECALCIFEROL) 2,500 Units, DAILY    No Known Allergies     Social history: The patient does not smoke.    Physical exam:  There were no vitals taken for this visit.    General: Healthy young woman in no acute distress.  Breast: No palpable masses on the right.  In the left there is a clear-cut abscess just superior to the nipple in the left breast.  The skin is somewhat flaky over the top as this appears to be right underneath the skin.  This measures at least 3 cm in width.  No other palpable masses  Axilla: No axillary adenopathy      Procedure: After verbal consent was obtained, the area was prepped with alcohol and then Betadine.  After infiltration with 1% lidocaine for a total of 4 cc, a small periareolar incision was made directly over the fluctuant area and I immediately  encountered an abscess cavity.  Cultures were obtained.  I was only able to make the incision approximately 1-1.5 cm in length as she did not tolerated very well due to discomfort.  The wound was irrigated and then packed with 1/4 inch gauze.  This was covered with sterile dressings.    Impression: Left breast abscess.  This could be indicative of granulomatous mastitis but clearly this was a large enough abscess that it needed to be drained.  Cultures were obtained and we should get those back in a couple days.  We can have her come back in tomorrow or my nurse will show her how to do the dressing changes.  She is not comfortable that she will be able to do it.  I told her if she cannot then she just needs to soak in a tub at least twice a day to help keep this draining.    Plan: We will await the culture results and I will call her with results.  She is to come in tomorrow for instructions on packing.  We will plan to see her back myself in about 2 weeks to make sure she is doing okay.

## 2023-09-07 NOTE — LETTER
9/7/2023         RE: Clara Phillips  [address] [address]        Dear Colleague,    Thank you for referring your patient, Clara Phillips, to the [facility] [place]. Please see a copy of my visit note below.    This is a 32-year-old woman who presents for evaluation of a left breast abscess.  The patient first started having issues about a month or so ago.  Had pain and swelling in the left breast.  She was started on antibiotics and had improvement.  She then did a mammogram and ultrasound.  They did a core needle biopsy that did show evidence of mastitis.  There were some granulomas seen but very minimal.  They did stains for acid-fast bacteria that also came back negative.  The patient then went on a trip to [facility] and while she was there had a recurrence of her symptoms.  She was started on antibiotics and there had an ultrasound that clearly showed an abscess.  She did not want anything done there however and came back home.  She presents now with increasing pain and swelling in the left breast.  The patient stopped nursing about 2 years ago.    Past Medical History:   Diagnosis Date     Diet controlled gestational diabetes mellitus (GDM) in third trimester 6/16/2020     Current Outpatient Medications   Medication Instructions     sulfamethoxazole-trimethoprim (BACTRIM DS) 800-160 MG tablet 1 tablet, Oral, 2 TIMES DAILY     Vitamin D3 (CHOLECALCIFEROL) 2,500 Units, DAILY    No Known Allergies     Social history: The patient does not smoke.    Physical exam:  There were no vitals taken for this visit.    General: Healthy young woman in no acute distress.  Breast: No palpable masses on the right.  In the left there is a clear-cut abscess just superior to the nipple in the left breast.  The skin is somewhat flaky over the top as this appears to be right underneath the skin.  This measures at least 3 cm in width.  No other palpable masses  Axilla: No axillary  adenopathy      Procedure: After verbal consent was obtained, the area was prepped with alcohol and then Betadine.  After infiltration with 1% lidocaine for a total of 4 cc, a small periareolar incision was made directly over the fluctuant area and I immediately encountered an abscess cavity.  Cultures were obtained.  I was only able to make the incision approximately 1-1.5 cm in length as she did not tolerated very well due to discomfort.  The wound was irrigated and then packed with 1/4 inch gauze.  This was covered with sterile dressings.    Impression: Left breast abscess.  This could be indicative of granulomatous mastitis but clearly this was a large enough abscess that it needed to be drained.  Cultures were obtained and we should get those back in a couple days.  We can have her come back in tomorrow or my nurse will show her how to do the dressing changes.  She is not comfortable that she will be able to do it.  I told her if she cannot then she just needs to soak in a tub at least twice a day to help keep this draining.    Plan: We will await the culture results and I will call her with results.  She is to come in tomorrow for instructions on packing.  We will plan to see her back myself in about 2 weeks to make sure she is doing okay.      Again, thank you for allowing me to participate in the care of your patient.        Sincerely,        Sweetie Collins MD

## 2023-09-08 ENCOUNTER — ALLIED HEALTH/NURSE VISIT (OUTPATIENT)
Dept: SURGERY | Facility: CLINIC | Age: 33
End: 2023-09-08
Attending: SPECIALIST
Payer: COMMERCIAL

## 2023-09-08 DIAGNOSIS — N61.1 BREAST ABSCESS: Primary | ICD-10-CM

## 2023-09-08 NOTE — NURSING NOTE
Clara presents to [facility] today for RN visit for dressing change.  RN assessment and EMR update.  Patient continues to have a lot of pain.  She said she is taking ibuprofen.  She is wearing a sports bra.  Encouraged her to try using an ice pack on and off over the weekend.  Previous gauze dressing removed.  Used NS to wet packing but when attempted to remove packing, patient cried out in pain and only able to pull packing out a 5 mm or so.  That is all patient would tolerate.  Trimmed packing wick, applied new 4x4 over the area.  Told her to shower tomorrow and when in the shower, pull the wick out a bit farther.  Told her to repeat this on Sunday and more than likely the wick will be completely removed at that time.  She agrees to this. Asked her to call me on Monday to check in.  Support and encouragement and reassurance provided.  Invited calls.  Dr. Collins updated.

## 2023-09-11 LAB — BACTERIA ABSC ANAEROBE+AEROBE CULT: ABNORMAL

## 2023-09-18 NOTE — PROGRESS NOTES
Clara comes in for follow up of her left breast abscess.  She overall says it is feeling better.  It has not been draining for the last couple days but it had up until that time.  She is just about to take her last antibiotic pill.    Physical exam:  Overall her breast looks much better.  It is completely healed then.  There is still just a slight bit of erythema around the edges but no fluctuance and no palpable mass.    Impression: Resolving abscess of the left breast.  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     Plan: Another 10 days of Bactrim.  Follow-up with me can be as needed after that.  I told her if it is not completely feeling back to normal, she should follow-up with me at that time.

## 2023-09-19 ENCOUNTER — OFFICE VISIT (OUTPATIENT)
Dept: SURGERY | Facility: CLINIC | Age: 33
End: 2023-09-19
Attending: FAMILY MEDICINE
Payer: COMMERCIAL

## 2023-09-19 DIAGNOSIS — N61.1 BREAST ABSCESS: Primary | ICD-10-CM

## 2023-09-19 PROCEDURE — G0463 HOSPITAL OUTPT CLINIC VISIT: HCPCS | Performed by: SPECIALIST

## 2023-09-19 PROCEDURE — 99213 OFFICE O/P EST LOW 20 MIN: CPT | Performed by: SPECIALIST

## 2023-09-19 RX ORDER — SULFAMETHOXAZOLE/TRIMETHOPRIM 800-160 MG
1 TABLET ORAL 2 TIMES DAILY
Qty: 20 TABLET | Refills: 0 | Status: SHIPPED | OUTPATIENT
Start: 2023-09-19 | End: 2023-10-17

## 2023-09-19 NOTE — LETTER
9/19/2023         RE: Clara Phillips  [address] [facility] [id number]        Dear Colleague,    Thank you for referring your patient, Clara Phillips, to the [facility] [place]. Please see a copy of my visit note below.    Clara comes in for follow up of her left breast abscess.  She overall says it is feeling better.  It has not been draining for the last couple days but it had up until that time.  She is just about to take her last antibiotic pill.    Physical exam:  Overall her breast looks much better.  It is completely healed then.  There is still just a slight bit of erythema around the edges but no fluctuance and no palpable mass.    Impression: Resolving abscess of the left breast.  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     Plan: Another 10 days of Bactrim.  Follow-up with me can be as needed after that.  I told her if it is not completely feeling back to normal, she should follow-up with me at that time.      Again, thank you for allowing me to participate in the care of your patient.        Sincerely,        Sweetie Collins MD

## 2023-09-19 NOTE — NURSING NOTE
Clara presents to [facility] for a follow up appointment with Dr. Collins regarding her recent breast abscess.  She is accompanied by her  for visit.  States she is still having some sharp, shooting pains and slight yellowish drainage from the abscess opening.  She completed her abx yesterday.    RN assessment and EMRupdate.  Patient met with Dr. Collins .  See dictation for details of visit and follow up plan.  Support provided, invited calls.

## 2023-10-17 ENCOUNTER — OFFICE VISIT (OUTPATIENT)
Dept: SURGERY | Facility: CLINIC | Age: 33
End: 2023-10-17
Attending: FAMILY MEDICINE
Payer: COMMERCIAL

## 2023-10-17 DIAGNOSIS — N61.22 GRANULOMATOUS MASTITIS OF LEFT BREAST: ICD-10-CM

## 2023-10-17 DIAGNOSIS — N61.1 BREAST ABSCESS: Primary | ICD-10-CM

## 2023-10-17 PROCEDURE — 11900 INJECT SKIN LESIONS </W 7: CPT | Mod: LT | Performed by: SPECIALIST

## 2023-10-17 PROCEDURE — 250N000011 HC RX IP 250 OP 636: Mod: JZ | Performed by: SPECIALIST

## 2023-10-17 PROCEDURE — 99213 OFFICE O/P EST LOW 20 MIN: CPT | Performed by: SPECIALIST

## 2023-10-17 PROCEDURE — 99213 OFFICE O/P EST LOW 20 MIN: CPT | Mod: 25 | Performed by: SPECIALIST

## 2023-10-17 PROCEDURE — 96372 THER/PROPH/DIAG INJ SC/IM: CPT

## 2023-10-17 RX ORDER — TRIAMCINOLONE ACETONIDE 40 MG/ML
40 INJECTION, SUSPENSION INTRA-ARTICULAR; INTRAMUSCULAR ONCE
Status: COMPLETED | OUTPATIENT
Start: 2023-10-17 | End: 2023-10-17

## 2023-10-17 RX ADMIN — TRIAMCINOLONE ACETONIDE 40 MG: 40 INJECTION, SUSPENSION INTRA-ARTICULAR; INTRAMUSCULAR at 15:00

## 2023-10-17 NOTE — LETTER
10/17/2023         RE: Clara Phillips  [address] [facility] [id number]        Dear Colleague,    Thank you for referring your patient, Clara Phillips, to the [facility] [place]. Please see a copy of my visit note below.    Clara comes in today again concerned about pain and swelling in the left breast.  Its not in the same area that was previously, it is now in the medial aspect of the breast.  This started about a week ago and has been progressing.    Physical exam:  There is a painful mass effect in the medial aspect of the left breast.  No erythema.  It is not warm to touch.    Impression: This is now appearing to be more consistent with granulomatous mastitis.  The fact that it is in a completely different place in the previous area is more indicative of that.  I gave her the option of trying a steroid injection versus a major work-up.  It clearly is not a cancer and the migratory nature of it so I do not know that we have to get additional imaging.  I explained that if it does not improve right away with a steroid injection, then we would get additional imaging.  She likes that idea.    Procedure: The medial aspect of the left breast was prepped with alcohol.  1 cc of 40 mg/cc Kenalog diluted in 1 cc of 1% lidocaine was injected throughout the area with a 27-gauge needle.  Pressure was held.  The patient tolerated this well with no complications.    Plan: She is to call and let us know how she is doing in the next couple days.  There is typically a very quick resolution with the steroid injection.  I did warn her that this can be a chronic problem that comes and goes.  Also talked about the fact that stress can bring this on and it sounds like she is under a lot of stress right now, working, going to school and caring for her children.  Her  is with her today and actually voiced his concern about that she is trying to do too much and it sounds like he will be very  supportive and trying to get her to cut back somewhat.      Again, thank you for allowing me to participate in the care of your patient.        Sincerely,        Sweetie Collins MD

## 2023-10-17 NOTE — PROGRESS NOTES
Clara comes in today again concerned about pain and swelling in the left breast.  Its not in the same area that was previously, it is now in the medial aspect of the breast.  This started about a week ago and has been progressing.    Physical exam:  There is a painful mass effect in the medial aspect of the left breast.  No erythema.  It is not warm to touch.    Impression: This is now appearing to be more consistent with granulomatous mastitis.  The fact that it is in a completely different place in the previous area is more indicative of that.  I gave her the option of trying a steroid injection versus a major work-up.  It clearly is not a cancer and the migratory nature of it so I do not know that we have to get additional imaging.  I explained that if it does not improve right away with a steroid injection, then we would get additional imaging.  She likes that idea.    Procedure: The medial aspect of the left breast was prepped with alcohol.  1 cc of 40 mg/cc Kenalog diluted in 1 cc of 1% lidocaine was injected throughout the area with a 27-gauge needle.  Pressure was held.  The patient tolerated this well with no complications.    Plan: She is to call and let us know how she is doing in the next couple days.  There is typically a very quick resolution with the steroid injection.  I did warn her that this can be a chronic problem that comes and goes.  Also talked about the fact that stress can bring this on and it sounds like she is under a lot of stress right now, working, going to school and caring for her children.  Her  is with her today and actually voiced his concern about that she is trying to do too much and it sounds like he will be very supportive and trying to get her to cut back somewhat.

## 2023-10-17 NOTE — NURSING NOTE
Clara presents to [facility] [place] today for a surgical consult with Dr. Collins  regarding a left breast lump and pain.  No redness, drainage or fevers.  She is accompanied by her  today.   RN assessment and EMR update.  Patient met with Dr. Collins .  See dictation for details of visit and follow up plan.

## 2023-11-13 ENCOUNTER — PATIENT OUTREACH (OUTPATIENT)
Dept: CARE COORDINATION | Facility: CLINIC | Age: 33
End: 2023-11-13
Payer: COMMERCIAL

## 2023-11-27 ENCOUNTER — PATIENT OUTREACH (OUTPATIENT)
Dept: CARE COORDINATION | Facility: CLINIC | Age: 33
End: 2023-11-27
Payer: COMMERCIAL

## 2023-12-28 ENCOUNTER — PATIENT OUTREACH (OUTPATIENT)
Dept: CARE COORDINATION | Facility: CLINIC | Age: 33
End: 2023-12-28
Payer: COMMERCIAL

## 2024-01-11 ENCOUNTER — PATIENT OUTREACH (OUTPATIENT)
Dept: CARE COORDINATION | Facility: CLINIC | Age: 34
End: 2024-01-11
Payer: COMMERCIAL

## 2024-03-03 ENCOUNTER — HEALTH MAINTENANCE LETTER (OUTPATIENT)
Age: 34
End: 2024-03-03

## 2024-06-10 ENCOUNTER — PATIENT OUTREACH (OUTPATIENT)
Dept: CARE COORDINATION | Facility: CLINIC | Age: 34
End: 2024-06-10
Payer: COMMERCIAL

## 2024-12-17 ENCOUNTER — OFFICE VISIT (OUTPATIENT)
Dept: FAMILY MEDICINE | Facility: CLINIC | Age: 34
End: 2024-12-17
Payer: COMMERCIAL

## 2024-12-17 VITALS
SYSTOLIC BLOOD PRESSURE: 110 MMHG | RESPIRATION RATE: 12 BRPM | DIASTOLIC BLOOD PRESSURE: 72 MMHG | BODY MASS INDEX: 25.23 KG/M2 | OXYGEN SATURATION: 99 % | HEIGHT: 64 IN | HEART RATE: 84 BPM | TEMPERATURE: 97.8 F | WEIGHT: 147.8 LBS

## 2024-12-17 DIAGNOSIS — Z13.220 LIPID SCREENING: ICD-10-CM

## 2024-12-17 DIAGNOSIS — R06.83 HABITUAL SNORING: ICD-10-CM

## 2024-12-17 DIAGNOSIS — Z31.69 ENCOUNTER FOR PRECONCEPTION CONSULTATION: ICD-10-CM

## 2024-12-17 DIAGNOSIS — R73.03 PREDIABETES: ICD-10-CM

## 2024-12-17 DIAGNOSIS — Z00.01 ENCOUNTER FOR ROUTINE ADULT MEDICAL EXAM WITH ABNORMAL FINDINGS: Primary | ICD-10-CM

## 2024-12-17 LAB
EST. AVERAGE GLUCOSE BLD GHB EST-MCNC: 120 MG/DL
HBA1C MFR BLD: 5.8 % (ref 0–5.6)

## 2024-12-17 PROCEDURE — 80061 LIPID PANEL: CPT | Performed by: FAMILY MEDICINE

## 2024-12-17 PROCEDURE — 36415 COLL VENOUS BLD VENIPUNCTURE: CPT | Performed by: FAMILY MEDICINE

## 2024-12-17 PROCEDURE — 83036 HEMOGLOBIN GLYCOSYLATED A1C: CPT | Performed by: FAMILY MEDICINE

## 2024-12-17 PROCEDURE — 99213 OFFICE O/P EST LOW 20 MIN: CPT | Mod: 25 | Performed by: FAMILY MEDICINE

## 2024-12-17 PROCEDURE — 99395 PREV VISIT EST AGE 18-39: CPT | Performed by: FAMILY MEDICINE

## 2024-12-17 SDOH — HEALTH STABILITY: PHYSICAL HEALTH: ON AVERAGE, HOW MANY DAYS PER WEEK DO YOU ENGAGE IN MODERATE TO STRENUOUS EXERCISE (LIKE A BRISK WALK)?: 4 DAYS

## 2024-12-17 SDOH — HEALTH STABILITY: PHYSICAL HEALTH: ON AVERAGE, HOW MANY MINUTES DO YOU ENGAGE IN EXERCISE AT THIS LEVEL?: 40 MIN

## 2024-12-17 ASSESSMENT — SOCIAL DETERMINANTS OF HEALTH (SDOH): HOW OFTEN DO YOU GET TOGETHER WITH FRIENDS OR RELATIVES?: ONCE A WEEK

## 2024-12-17 NOTE — PROGRESS NOTES
"Preventive Care Visit  [facility] TAYO Simmons MD, Family Medicine  Dec 17, 2024      Assessment & Plan     Clara was seen today for physical.    Diagnoses and all orders for this visit:    Encounter for routine adult medical exam with abnormal findings    Prediabetes  Comments:  slight improvement in A1c to 5.8 from 5.9, working on diet, worry about GDM as planning to conceive, diet edu prov  Orders:  -     Hemoglobin A1c; Future  -     Hemoglobin A1c    Lipid screening  -     Lipid Profile; Future  -     Lipid Profile    Habitual snoring  Comments:  referral to ENT to rule out obstructive causes for snoring , very crowded mouth cavity  Orders:  -     Adult ENT  Referral; Future    Encounter for preconception consultation    Other orders  -     PRIMARY CARE FOLLOW-UP SCHEDULING; Future  -     REVIEW OF HEALTH MAINTENANCE PROTOCOL ORDERS  -     INFLUENZA VACCINE,SPLIT VIRUS,TRIVALENT,PF(FLUZONE); Future  -     COVID-19 12+ (PFIZER); Future       Preconception counseling done   Patient has been advised of split billing requirements and indicates understanding: No        BMI  Estimated body mass index is 25.37 kg/m  as calculated from the following:    Height as of this encounter: 1.626 m (5' 4\").    Weight as of this encounter: 67 kg (147 lb 12.8 oz).   Weight management plan: Discussed healthy diet and exercise guidelines    Counseling  Appropriate preventive services were addressed with this patient via screening, questionnaire, or discussion as appropriate for fall prevention, nutrition, physical activity, Tobacco-use cessation, social engagement, weight loss and cognition.  Checklist reviewing preventive services available has been given to the patient.  Reviewed patient's diet, addressing concerns and/or questions.       Work on weight loss  Regular exercise    Tavo Elizabeth is a 33 year old, presenting for the following:  Physical (Annual px. Fasting. )        " 12/17/2024     9:07 AM   Additional Questions   Roomed by Orin MURRAY MA          HPI BMI 25  Wt Readings from Last 4 Encounters:   12/17/24 67 kg (147 lb 12.8 oz)   09/05/23 68 kg (150 lb)   06/28/23 67.3 kg (148 lb 6.4 oz)   12/13/22 65.3 kg (144 lb)          Health Care Directive  Patient does not have a Health Care Directive: Discussed advance care planning with patient; information given to patient to review.      12/17/2024   General Health   How would you rate your overall physical health? (!) FAIR   Feel stress (tense, anxious, or unable to sleep) Only a little      (!) STRESS CONCERN      12/17/2024   Nutrition   Three or more servings of calcium each day? Yes   Diet: Regular (no restrictions)   How many servings of fruit and vegetables per day? (!) 0-1   How many sweetened beverages each day? (!) 2            12/17/2024   Exercise   Days per week of moderate/strenous exercise 4 days   Average minutes spent exercising at this level 40 min            12/17/2024   Social Factors   Frequency of gathering with friends or relatives Once a week   Worry food won't last until get money to buy more No   Food not last or not have enough money for food? No   Do you have housing? (Housing is defined as stable permanent housing and does not include staying ouside in a car, in a tent, in an abandoned building, in an overnight shelter, or couch-surfing.) Yes   Are you worried about losing your housing? No   Lack of transportation? No   Unable to get utilities (heat,electricity)? No            12/17/2024   Dental   Dentist two times every year? Yes            12/17/2024   TB Screening   Were you born outside of the [place]? Yes            Today's PHQ-2 Score:       12/17/2024     7:40 AM   PHQ-2 ( 1999 [employer])   Q1: Little interest or pleasure in doing things 1    Q2: Feeling down, depressed or hopeless 0    PHQ-2 Score 1    Q1: Little interest or pleasure in doing things Several days   Q2: Feeling down, depressed or hopeless Not  at all   PHQ-2 Score 1       Patient-reported           2024   Substance Use   Alcohol more than 3/day or more than 7/wk No   Do you use any other substances recreationally? No        Social History     Tobacco Use    Smoking status: Never     Passive exposure: Never    Smokeless tobacco: Never   Vaping Use    Vaping status: Never Used   Substance Use Topics    Alcohol use: Never    Drug use: No           7/10/2023   LAST FHS-7 RESULTS   1st degree relative breast or ovarian cancer No   Any relative bilateral breast cancer No   Any male have breast cancer No   Any ONE woman have BOTH breast AND ovarian cancer No   Any woman with breast cancer before 50yrs No   2 or more relatives with breast AND/OR ovarian cancer No   2 or more relatives with breast AND/OR bowel cancer No           Mammogram Screening - Patient under 40 years of age: Routine Mammogram Screening not recommended.         2024   STI Screening   New sexual partner(s) since last STI/HIV test? No        History of abnormal Pap smear: No - age 30- 64 PAP with HPV every 5 years recommended        Latest Ref Rng & Units 2021     9:24 AM 2019     8:24 AM   PAP / HPV   PAP  Negative for Intraepithelial Lesion or Malignancy (NILM)  Negative for squamous intraepithelial lesion or malignancy  Electronically signed by Cornelio Garza CT (ASCP) on 2019 at 12:37 PM      HPV 16 DNA Negative Negative     HPV 18 DNA Negative Negative     Other HR HPV Negative Negative             2024   Contraception/Family Planning   Questions about contraception or family planning No           Reviewed and updated as needed this visit by Provider   Tobacco  Allergies  Meds  Problems  Med Hx  Surg Hx  Fam Hx            OB History    Para Term  AB Living   1 1 1 0 0 1   SAB IAB Ectopic Multiple Live Births   0 0 0 0 1      # Outcome Date GA Lbr Urbano/2nd Weight Sex Type Anes PTL Lv   1 Term 20 38w1d 10:00 / 03:55 2.485  "kg (5 lb 7.7 oz) F Vag-Vacuum EPI N SWETA      Name: GABBI,FEMALE-TACHO      Apgar1: 8  Apgar5: 9         Review of Systems  Constitutional, neuro, ENT, endocrine, pulmonary, cardiac, gastrointestinal, genitourinary, musculoskeletal, integument and psychiatric systems are negative, except as otherwise noted.     Objective    Exam  /72   Pulse 84   Temp 97.8  F (36.6  C) (Temporal)   Resp 12   Ht 1.626 m (5' 4\")   Wt 67 kg (147 lb 12.8 oz)   LMP 12/15/2024 (Exact Date)   SpO2 99%   BMI 25.37 kg/m     Estimated body mass index is 25.37 kg/m  as calculated from the following:    Height as of this encounter: 1.626 m (5' 4\").    Weight as of this encounter: 67 kg (147 lb 12.8 oz).    Physical Exam  GENERAL: alert and no distress  EYES: Eyes grossly normal to inspection, PERRL and conjunctivae and sclerae normal  HENT: ear canals and TM's normal, nose and mouth without ulcers or lesions  NECK: no adenopathy, no asymmetry, masses, or scars  RESP: lungs clear to auscultation - no rales, rhonchi or wheezes  CV: regular rate and rhythm, normal S1 S2, no S3 or S4, no murmur, click or rub, no peripheral edema  MS: no gross musculoskeletal defects noted, no edema  SKIN: no suspicious lesions or rashes  NEURO: Normal strength and tone, mentation intact and speech normal  PSYCH: mentation appears normal, affect normal/bright        Signed Electronically by: Blanquita Simmons MD    "

## 2024-12-17 NOTE — PATIENT INSTRUCTIONS
Patient Education   Preventive Care Advice   This is general advice given by our system to help you stay healthy. However, your care team may have specific advice just for you. Please talk to your care team about your preventive care needs.  Nutrition  Eat 5 or more servings of fruits and vegetables each day.  Try wheat bread, brown rice and whole grain pasta (instead of white bread, rice, and pasta).  Get enough calcium and vitamin D. Check the label on foods and aim for 100% of the RDA (recommended daily allowance).  Lifestyle  Exercise at least 150 minutes each week  (30 minutes a day, 5 days a week).  Do muscle strengthening activities 2 days a week. These help control your weight and prevent disease.  No smoking.  Wear sunscreen to prevent skin cancer.  Have a dental exam and cleaning every 6 months.  Yearly exams  See your health care team every year to talk about:  Any changes in your health.  Any medicines your care team has prescribed.  Preventive care, family planning, and ways to prevent chronic diseases.  Shots (vaccines)   HPV shots (up to age 26), if you've never had them before.  Hepatitis B shots (up to age 59), if you've never had them before.  COVID-19 shot: Get this shot when it's due.  Flu shot: Get a flu shot every year.  Tetanus shot: Get a tetanus shot every 10 years.  Pneumococcal, hepatitis A, and RSV shots: Ask your care team if you need these based on your risk.  Shingles shot (for age 50 and up)  General health tests  Diabetes screening:  Starting at age 35, Get screened for diabetes at least every 3 years.  If you are younger than age 35, ask your care team if you should be screened for diabetes.  Cholesterol test: At age 39, start having a cholesterol test every 5 years, or more often if advised.  Bone density scan (DEXA): At age 50, ask your care team if you should have this scan for osteoporosis (brittle bones).  Hepatitis C: Get tested at least once in your life.  STIs (sexually  transmitted infections)  Before age 24: Ask your care team if you should be screened for STIs.  After age 24: Get screened for STIs if you're at risk. You are at risk for STIs (including HIV) if:  You are sexually active with more than one person.  You don't use condoms every time.  You or a partner was diagnosed with a sexually transmitted infection.  If you are at risk for HIV, ask about PrEP medicine to prevent HIV.  Get tested for HIV at least once in your life, whether you are at risk for HIV or not.  Cancer screening tests  Cervical cancer screening: If you have a cervix, begin getting regular cervical cancer screening tests starting at age 21.  Breast cancer scan (mammogram): If you've ever had breasts, begin having regular mammograms starting at age 40. This is a scan to check for breast cancer.  Colon cancer screening: It is important to start screening for colon cancer at age 45.  Have a colonoscopy test every 10 years (or more often if you're at risk) Or, ask your provider about stool tests like a FIT test every year or Cologuard test every 3 years.  To learn more about your testing options, visit:   .  For help making a decision, visit:   https://bit.ly/qz34977.  Prostate cancer screening test: If you have a prostate, ask your care team if a prostate cancer screening test (PSA) at age 55 is right for you.  Lung cancer screening: If you are a current or former smoker ages 50 to 80, ask your care team if ongoing lung cancer screenings are right for you.  For informational purposes only. Not to replace the advice of your health care provider. Copyright   2023 [employer]. All rights reserved. Clinically reviewed by the [facility] Transitions Program. [employer] [id number] - REV 01/24.

## 2024-12-18 LAB
CHOLEST SERPL-MCNC: 144 MG/DL
FASTING STATUS PATIENT QL REPORTED: YES
HDLC SERPL-MCNC: 43 MG/DL
LDLC SERPL CALC-MCNC: 83 MG/DL
NONHDLC SERPL-MCNC: 101 MG/DL
TRIGL SERPL-MCNC: 91 MG/DL